# Patient Record
Sex: FEMALE | Race: WHITE | NOT HISPANIC OR LATINO | Employment: UNEMPLOYED | URBAN - METROPOLITAN AREA
[De-identification: names, ages, dates, MRNs, and addresses within clinical notes are randomized per-mention and may not be internally consistent; named-entity substitution may affect disease eponyms.]

---

## 2009-03-17 LAB — EXTERNAL HIV SCREEN: NORMAL

## 2017-01-02 ENCOUNTER — HOSPITAL ENCOUNTER (OUTPATIENT)
Dept: RADIOLOGY | Facility: MEDICAL CENTER | Age: 42
Discharge: HOME | End: 2017-01-02
Payer: COMMERCIAL

## 2017-01-02 ENCOUNTER — ALLSCRIPTS OFFICE VISIT (OUTPATIENT)
Dept: OTHER | Facility: OTHER | Age: 42
End: 2017-01-02

## 2017-01-02 ENCOUNTER — TRANSCRIBE ORDERS (OUTPATIENT)
Dept: ADMINISTRATIVE | Facility: HOSPITAL | Age: 42
End: 2017-01-02
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
## 2017-01-02 DIAGNOSIS — S59.901A INJURY OF RIGHT ELBOW: ICD-10-CM

## 2017-01-02 PROCEDURE — 73080 X-RAY EXAM OF ELBOW: CPT

## 2017-01-03 ENCOUNTER — GENERIC CONVERSION - ENCOUNTER (OUTPATIENT)
Dept: OTHER | Facility: OTHER | Age: 42
End: 2017-01-03

## 2017-01-06 ENCOUNTER — GENERIC CONVERSION - ENCOUNTER (OUTPATIENT)
Dept: OTHER | Facility: OTHER | Age: 42
End: 2017-01-06

## 2017-05-01 DIAGNOSIS — D68.9 COAGULATION DEFECT (HCC): ICD-10-CM

## 2017-05-01 DIAGNOSIS — E78.5 HYPERLIPIDEMIA: ICD-10-CM

## 2017-05-01 DIAGNOSIS — R73.03 PREDIABETES: ICD-10-CM

## 2017-05-01 DIAGNOSIS — E03.9 HYPOTHYROIDISM: ICD-10-CM

## 2017-06-23 ENCOUNTER — ALLSCRIPTS OFFICE VISIT (OUTPATIENT)
Dept: OTHER | Facility: OTHER | Age: 42
End: 2017-06-23

## 2017-06-30 ENCOUNTER — ALLSCRIPTS OFFICE VISIT (OUTPATIENT)
Dept: OTHER | Facility: OTHER | Age: 42
End: 2017-06-30

## 2017-08-31 ENCOUNTER — ALLSCRIPTS OFFICE VISIT (OUTPATIENT)
Dept: OTHER | Facility: OTHER | Age: 42
End: 2017-08-31

## 2017-09-01 ENCOUNTER — GENERIC CONVERSION - ENCOUNTER (OUTPATIENT)
Dept: OTHER | Facility: OTHER | Age: 42
End: 2017-09-01

## 2017-11-02 ENCOUNTER — GENERIC CONVERSION - ENCOUNTER (OUTPATIENT)
Dept: OTHER | Facility: OTHER | Age: 42
End: 2017-11-02

## 2018-01-12 NOTE — RESULT NOTES
Verified Results  * XR ELBOW 3+ VIEW RIGHT 02Jan2017 05:24PM Ami Gunn Order Number: [id number]     Test Name Result Flag Reference   XR ELBOW 3+ VW RIGHT (Report)     RIGHT ELBOW     INDICATION: Posterior right elbow pain and swelling  COMPARISON: None     VIEWS: 4; 4 images     FINDINGS:     There is no acute fracture or dislocation  There is no joint effusion  No degenerative changes  No lytic or blastic lesions are seen  Soft tissues are unremarkable  IMPRESSION:     No acute osseous abnormality         Workstation performed: [id number]     Signed by:   Beverley Chamorro MD   1/3/17

## 2018-01-13 ENCOUNTER — GENERIC CONVERSION - ENCOUNTER (OUTPATIENT)
Dept: OTHER | Facility: OTHER | Age: 43
End: 2018-01-13

## 2018-01-13 VITALS
RESPIRATION RATE: 16 BRPM | TEMPERATURE: 98.1 F | SYSTOLIC BLOOD PRESSURE: 140 MMHG | OXYGEN SATURATION: 99 % | HEART RATE: 87 BPM | DIASTOLIC BLOOD PRESSURE: 90 MMHG

## 2018-01-13 VITALS
TEMPERATURE: 98.5 F | OXYGEN SATURATION: 98 % | DIASTOLIC BLOOD PRESSURE: 90 MMHG | SYSTOLIC BLOOD PRESSURE: 140 MMHG | HEART RATE: 86 BPM

## 2018-01-14 VITALS
DIASTOLIC BLOOD PRESSURE: 84 MMHG | HEART RATE: 93 BPM | HEIGHT: 67 IN | TEMPERATURE: 99 F | SYSTOLIC BLOOD PRESSURE: 134 MMHG | RESPIRATION RATE: 16 BRPM | OXYGEN SATURATION: 99 %

## 2018-01-15 NOTE — PSYCH
History of Present Illness  Psychotherapy Provided  Luke: Individual Psychotherapy 50 minutes provided today  Goals addressed in session:   Met with pt individually for the initial session  Pt states that she has been in many high stress situations and that she has been able to manage her anxiety but that over the past month it has been uncontrollable  Pt shared some of the current significant stressors that has been experiencing and we discussed the "lack of control" she has over them  Discussed that due to the pt's history of very stressful situations she is more prone to thinking the worse case scenarios  Allowed pt to vent about the stressors and validated her frustrations  Pt will follow up in a few weeks to continue to talk through the stressors and develop some coping skills  HPI - Psych: Pt has been prescribed hydroxyzine and Xanax as needed, as well as Sertraline  Pt has not started the Sertraline due to the warnings labels and wanting to talk to the doctor further  Pt has 4 children under the age of 15 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  One of pt's major stressors is related to her oldest son being bullied at school and having some significant unexplained health issues  Pt in the past worked as a  in [place]  Pt was calm and cooperative throughout the session  Pt's mood and affect appeared to be within normal limits  Pt denies SI   Note   Note:   Encouraged pt to try to continue to find the control in situations no matter how small it may seem and to take some time when she can to herself  Pt will follow up in a few weeks to continues to evaluate her anxiety and develop coping skills  Assessment    1   Anxiety (300 00) (F41 9)    Signatures   Electronically signed by : Carrol Morin LCSW; Jun 30 2017 10:54AM EST                       (Author)

## 2018-01-24 ENCOUNTER — TELEPHONE (OUTPATIENT)
Dept: FAMILY MEDICINE CLINIC | Facility: CLINIC | Age: 43
End: 2018-01-24

## 2018-01-24 VITALS
DIASTOLIC BLOOD PRESSURE: 78 MMHG | SYSTOLIC BLOOD PRESSURE: 118 MMHG | HEART RATE: 85 BPM | RESPIRATION RATE: 17 BRPM | HEIGHT: 67 IN | TEMPERATURE: 98.5 F | OXYGEN SATURATION: 85 %

## 2018-01-24 DIAGNOSIS — E03.9 HYPOTHYROIDISM, UNSPECIFIED TYPE: ICD-10-CM

## 2018-01-24 DIAGNOSIS — K13.79 MASS OF ORAL CAVITY: Primary | ICD-10-CM

## 2018-01-24 RX ORDER — LEVOTHYROXINE SODIUM 0.07 MG/1
75 TABLET ORAL DAILY
Qty: 30 TABLET | Refills: 0 | Status: SHIPPED | OUTPATIENT
Start: 2018-01-24 | End: 2018-02-10 | Stop reason: SDUPTHER

## 2018-01-24 RX ORDER — OXYCODONE AND ACETAMINOPHEN 7.5; 325 MG/1; MG/1
1 TABLET ORAL EVERY 12 HOURS
COMMUNITY
Start: 2015-12-01 | End: 2018-01-24 | Stop reason: SDUPTHER

## 2018-01-24 RX ORDER — OXYCODONE AND ACETAMINOPHEN 7.5; 325 MG/1; MG/1
1 TABLET ORAL EVERY 12 HOURS
Qty: 30 TABLET | Refills: 0 | Status: SHIPPED | OUTPATIENT
Start: 2018-01-24 | End: 2018-11-09 | Stop reason: SDUPTHER

## 2018-01-24 RX ORDER — LEVOTHYROXINE SODIUM 0.07 MG/1
1 TABLET ORAL DAILY
COMMUNITY
Start: 2017-08-11 | End: 2018-01-24 | Stop reason: SDUPTHER

## 2018-01-25 DIAGNOSIS — F41.9 ANXIETY: Primary | ICD-10-CM

## 2018-01-26 ENCOUNTER — TELEPHONE (OUTPATIENT)
Dept: FAMILY MEDICINE CLINIC | Facility: CLINIC | Age: 43
End: 2018-01-26

## 2018-01-26 RX ORDER — EZETIMIBE 10 MG/1
1 TABLET ORAL DAILY
COMMUNITY
Start: 2017-08-31 | End: 2018-03-30 | Stop reason: SDUPTHER

## 2018-01-26 RX ORDER — ALPRAZOLAM 0.5 MG/1
TABLET ORAL
COMMUNITY
Start: 2013-08-31 | End: 2018-02-07 | Stop reason: SDUPTHER

## 2018-01-26 RX ORDER — HYDROXYZINE PAMOATE 50 MG/1
CAPSULE ORAL
Qty: 90 CAPSULE | Refills: 1 | Status: SHIPPED | OUTPATIENT
Start: 2018-01-26 | End: 2018-01-29 | Stop reason: SDUPTHER

## 2018-01-26 RX ORDER — HYDROXYZINE PAMOATE 50 MG/1
CAPSULE ORAL
COMMUNITY
Start: 2017-06-23 | End: 2018-05-03 | Stop reason: SDUPTHER

## 2018-01-26 RX ORDER — HYDROCODONE BITARTRATE AND ACETAMINOPHEN 5; 325 MG/1; MG/1
1 TABLET ORAL EVERY 6 HOURS PRN
COMMUNITY
Start: 2018-01-13 | End: 2018-02-08 | Stop reason: SDUPTHER

## 2018-01-26 RX ORDER — FLUOXETINE 20 MG/1
TABLET, FILM COATED ORAL
Refills: 0 | COMMUNITY
Start: 2017-10-25 | End: 2018-04-25 | Stop reason: SDUPTHER

## 2018-01-26 NOTE — TELEPHONE ENCOUNTER
Pt called she picked up the rx Oxycodone 7 5-325MG  She has  a questions about the rx and wants to make sure this is the correct medication  Pt would like a return phone call   Pt's number is [phone]

## 2018-01-27 NOTE — TELEPHONE ENCOUNTER
Received inbox message regarding patient's prescription for hydrocodone  She is questioning if this is the correct prescription  I tried to call her back  No answer and voicemail box was full  I do not know that I can't answer the question in any case  I did not write the original prescription and appears to be a refill of the medication she was given several weeks ago so I do think it is probably correct

## 2018-01-29 DIAGNOSIS — F41.9 ANXIETY: ICD-10-CM

## 2018-01-29 RX ORDER — HYDROXYZINE PAMOATE 50 MG/1
CAPSULE ORAL
Qty: 90 CAPSULE | Refills: 1 | Status: SHIPPED | OUTPATIENT
Start: 2018-01-29 | End: 2018-03-09 | Stop reason: SDUPTHER

## 2018-02-07 DIAGNOSIS — F41.1 GENERALIZED ANXIETY DISORDER: Primary | ICD-10-CM

## 2018-02-07 NOTE — TELEPHONE ENCOUNTER
Patient called requesting rx for Endoscopy  Patient states she will drop off the pathology report for us to review

## 2018-02-08 DIAGNOSIS — G89.29 OTHER CHRONIC PAIN: Primary | ICD-10-CM

## 2018-02-08 DIAGNOSIS — E03.9 HYPOTHYROIDISM, UNSPECIFIED TYPE: ICD-10-CM

## 2018-02-08 DIAGNOSIS — F41.1 GENERALIZED ANXIETY DISORDER: ICD-10-CM

## 2018-02-08 PROBLEM — R73.03 PREDIABETES: Status: ACTIVE | Noted: 2017-02-17

## 2018-02-08 PROBLEM — E78.5 DYSLIPIDEMIA: Status: ACTIVE | Noted: 2017-02-17

## 2018-02-08 RX ORDER — ALPRAZOLAM 0.5 MG/1
0.5 TABLET ORAL 2 TIMES DAILY PRN
Qty: 30 TABLET | Refills: 1 | OUTPATIENT
Start: 2018-02-08 | End: 2018-02-08 | Stop reason: SDUPTHER

## 2018-02-08 RX ORDER — PREDNISONE 10 MG/1
TABLET ORAL
COMMUNITY
Start: 2018-01-13 | End: 2018-03-09 | Stop reason: SDUPTHER

## 2018-02-08 RX ORDER — ALPRAZOLAM 0.5 MG/1
0.5 TABLET ORAL 2 TIMES DAILY PRN
Qty: 180 TABLET | Refills: 1 | Status: SHIPPED | OUTPATIENT
Start: 2018-02-08 | End: 2018-07-31 | Stop reason: SDUPTHER

## 2018-02-08 RX ORDER — CLINDAMYCIN HYDROCHLORIDE 300 MG/1
CAPSULE ORAL
COMMUNITY
Start: 2018-01-08 | End: 2018-03-09 | Stop reason: SDUPTHER

## 2018-02-08 RX ORDER — CLARITHROMYCIN 500 MG/1
TABLET, COATED ORAL
COMMUNITY
Start: 2018-01-11 | End: 2018-03-09 | Stop reason: SDUPTHER

## 2018-02-08 RX ORDER — HYDROCODONE BITARTRATE AND ACETAMINOPHEN 5; 325 MG/1; MG/1
1 TABLET ORAL EVERY 6 HOURS PRN
Qty: 60 TABLET | Refills: 0 | Status: SHIPPED | OUTPATIENT
Start: 2018-02-08 | End: 2018-03-09 | Stop reason: SDUPTHER

## 2018-02-08 RX ORDER — ALPRAZOLAM 0.5 MG/1
0.5 TABLET ORAL 2 TIMES DAILY PRN
Qty: 30 TABLET | Refills: 0 | Status: CANCELLED | OUTPATIENT
Start: 2018-02-08

## 2018-02-08 NOTE — TELEPHONE ENCOUNTER
Pt called into the office stating that she needs her Alprazolam filled  [facility]  Pt stated to me that she should be getting 180 tabs and not 30 tabs  She is out of her med  Please call pt [facility]  Thank you!

## 2018-02-09 DIAGNOSIS — R22.1 SENSATION OF LUMP IN THROAT: Primary | ICD-10-CM

## 2018-02-09 DIAGNOSIS — R13.10 DYSPHAGIA, UNSPECIFIED TYPE: ICD-10-CM

## 2018-02-10 RX ORDER — LEVOTHYROXINE SODIUM 0.07 MG/1
75 TABLET ORAL DAILY
Qty: 90 TABLET | Refills: 1 | Status: SHIPPED | OUTPATIENT
Start: 2018-02-10 | End: 2018-07-18 | Stop reason: SDUPTHER

## 2018-03-09 ENCOUNTER — OFFICE VISIT (OUTPATIENT)
Dept: FAMILY MEDICINE CLINIC | Facility: CLINIC | Age: 43
End: 2018-03-09
Payer: COMMERCIAL

## 2018-03-09 VITALS
RESPIRATION RATE: 20 BRPM | SYSTOLIC BLOOD PRESSURE: 120 MMHG | DIASTOLIC BLOOD PRESSURE: 70 MMHG | TEMPERATURE: 97.3 F | OXYGEN SATURATION: 98 % | HEIGHT: 67 IN | HEART RATE: 80 BPM

## 2018-03-09 DIAGNOSIS — G50.1 ATYPICAL FACIAL PAIN: Primary | ICD-10-CM

## 2018-03-09 DIAGNOSIS — D10.30: ICD-10-CM

## 2018-03-09 PROCEDURE — 99214 OFFICE O/P EST MOD 30 MIN: CPT | Performed by: FAMILY MEDICINE

## 2018-03-09 RX ORDER — GABAPENTIN 300 MG/1
300 CAPSULE ORAL
Qty: 30 CAPSULE | Refills: 1 | Status: SHIPPED | OUTPATIENT
Start: 2018-03-09 | End: 2018-05-02 | Stop reason: SDUPTHER

## 2018-03-09 RX ORDER — FLUTICASONE PROPIONATE 50 MCG
SPRAY, SUSPENSION (ML) NASAL
COMMUNITY
Start: 2018-02-26

## 2018-03-09 NOTE — PROGRESS NOTES
Assessment/Plan:   1  Atypical facial pain  Reviewed patient's symptoms with her  She has been having persistent discomfort  She states that she has not reviewed her biopsy results with her oral surgeon yet  At this time, request records for further evaluation  She has been in to see her otolaryngologist   She was advised to continue with her current treatment  She is continue with a mechanically soft diet  It is unclear if her symptoms are neuropathic  At this time, start treatment empirically with gabapentin 300 mg q h s     She was advised that persisting use of her oxycodone is not advisable  Hold off on continue with this treatment   - gabapentin (NEURONTIN) 300 mg capsule; Take 1 capsule (300 mg total) by mouth daily at bedtime  Dispense: 30 capsule; Refill: 1    2  Fibroma of oral cavity  Continue with regular follow-up with her oral surgeon   - gabapentin (NEURONTIN) 300 mg capsule; Take 1 capsule (300 mg total) by mouth daily at bedtime  Dispense: 30 capsule; Refill: 1     Diagnoses and all orders for this visit:    Atypical facial pain  -     gabapentin (NEURONTIN) 300 mg capsule; Take 1 capsule (300 mg total) by mouth daily at bedtime    Fibroma of oral cavity  -     gabapentin (NEURONTIN) 300 mg capsule; Take 1 capsule (300 mg total) by mouth daily at bedtime    Other orders  -     fluticasone (FLONASE) 50 mcg/act nasal spray;           Subjective:  Chief Complaint   Patient presents with    Follow-up     discuss biopsy results        Patient ID: Aarti Capps is a 36 y o  female  Patient is a 79-year-old female presents today for follow-up from her recent oral surgery  She states that she is here to review her biopsy results  She has called her oral surgeon's office multiple times to review this testing however she never received any call back  She brought in these results for review  She states that she was found to have a slow growing benign fibroma    She has been healing postoperatively  She unfortunately has been developing pain over the anterior portion of her face that radiates into her right ear  She has been having associated dizziness  She has been in to see her otolaryngologist   At this time, she has not been taking anything other than ibuprofen for symptom relief  Review of Systems   Constitutional: Negative for activity change, chills, fatigue and fever  HENT: Positive for facial swelling, sinus pain and sinus pressure  Negative for congestion, ear pain and sore throat  Eyes: Negative for redness, itching and visual disturbance  Respiratory: Negative for cough and shortness of breath  Cardiovascular: Negative for chest pain and palpitations  Gastrointestinal: Negative for abdominal pain, diarrhea and nausea  Endocrine: Negative for cold intolerance and heat intolerance  Genitourinary: Negative for dysuria, flank pain and frequency  Musculoskeletal: Negative for arthralgias, back pain, gait problem and myalgias  Skin: Negative for color change  Allergic/Immunologic: Negative for environmental allergies  Neurological: Negative for dizziness, numbness and headaches  Psychiatric/Behavioral: Negative for behavioral problems and sleep disturbance  The following portions of the patient's history were reviewed and updated as appropriate : past family history, past medical history, past social history and past surgical history  Objective:  Vitals:    03/09/18 0811   BP: 120/70   BP Location: Left arm   Patient Position: Sitting   Cuff Size: Large   Pulse: 80   Resp: 20   Temp: (!) 97 3 °F (36 3 °C)   TempSrc: Tympanic   SpO2: 98%   Height: 5' 6 53" (1 69 m)        Physical Exam   Constitutional: She appears well-developed and well-nourished  HENT:   Head: Normocephalic and atraumatic  Neck: Neck supple  Pulmonary/Chest: Effort normal and breath sounds normal    Psychiatric: She has a normal mood and affect   Her behavior is normal  Judgment and thought content normal

## 2018-03-30 DIAGNOSIS — E78.5 DYSLIPIDEMIA: Primary | ICD-10-CM

## 2018-03-30 RX ORDER — EZETIMIBE 10 MG/1
10 TABLET ORAL DAILY
Qty: 90 TABLET | Refills: 0 | Status: SHIPPED | OUTPATIENT
Start: 2018-03-30 | End: 2018-07-18 | Stop reason: SDUPTHER

## 2018-03-30 NOTE — TELEPHONE ENCOUNTER
Pt called and is still requesting her medication for Ezetimibe 90 day supply she left this on the RX line and [facility]  She needs 90 day supply or her insurance will not cover it

## 2018-04-03 ENCOUNTER — TELEPHONE (OUTPATIENT)
Dept: FAMILY MEDICINE CLINIC | Facility: CLINIC | Age: 43
End: 2018-04-03

## 2018-04-03 NOTE — TELEPHONE ENCOUNTER
----- Message from Jose Kathleen DO sent at 4/2/2018  9:28 AM EDT -----  Please  NAHUN hunter schedule her for Bw  Thank you  ----- Message -----  From: Kade Collado  Sent: 3/31/2018  11:51 AM  To: Jose Kathleen DO    Patient had a Lipid Panel done 01/08/18 at [facility]  Would you like her to have another test done now?      ----- Message -----  From: Jose Kathleen DO  Sent: 3/30/2018  12:30 AM  To: [address]    Please confirm when patient had her last blood work for her cholesterol  Thank you  ----- Message -----  From: Wojciech Chung MA  Sent: 3/29/2018   8:33 AM  To: Dino Ken DO    Pt stated she was told by Dr Tyson Oakley to get blood work done in 3 months when I read the message from Gilberto Lockett stated 6 month she confused  Please advised?   ----- Message -----  From: Bro Bernabe DO  Sent: 3/28/2018  10:53 AM  To: [address]    Please let patient know that I reviewed her recent blood work  It was did dated January 8th  On that blood work her blood sugar was very slightly elevated at 1 006  Normal is less than 100  The hemoglobin A1c was 6 3 which indicates just pre diabetes or borderline diabetes  This mostly requires stricter diet and regular exercise to control it  Cholesterol was elevated at 206 but this is actually an improvement from last year when it was 226  Thyroid test shows a TSH which is slightly above normal but this is also improved from what it was over the last 2 years  CBC is normal and chemistry panel is normal   No changes in any medication necessary at this time  Would recommend recheck of labs in about 6 months

## 2018-04-06 NOTE — TELEPHONE ENCOUNTER
SPOKE TO PT AND SHE STATED THAT SHE HAD AN APPT WITH YOU ABOUT 1 MONTH AGO AND TOLD HER TO GET BW DONE IN 3 MONTHS, BUT THE PT SPOKE TO DR Brian Duncan LAST WEEK AND TOLD HER TO GET HER BW DONE IN 6 MONTHS   PLEASE CLARIFY WHAT MONTH SHE NEEDS TO GET THIS BW DONE AND NOTIFY PT

## 2018-04-25 DIAGNOSIS — F41.9 ANXIETY: Primary | ICD-10-CM

## 2018-04-25 RX ORDER — FLUOXETINE 20 MG/1
TABLET, FILM COATED ORAL
Qty: 90 TABLET | Refills: 0 | Status: SHIPPED | OUTPATIENT
Start: 2018-04-25 | End: 2018-07-31 | Stop reason: SDUPTHER

## 2018-05-02 DIAGNOSIS — G50.1 ATYPICAL FACIAL PAIN: ICD-10-CM

## 2018-05-02 RX ORDER — GABAPENTIN 300 MG/1
300 CAPSULE ORAL
Qty: 30 CAPSULE | Refills: 1 | Status: SHIPPED | OUTPATIENT
Start: 2018-05-02 | End: 2018-07-31 | Stop reason: SDUPTHER

## 2018-05-03 DIAGNOSIS — F41.9 ANXIETY: Primary | ICD-10-CM

## 2018-05-03 RX ORDER — HYDROXYZINE PAMOATE 50 MG/1
CAPSULE ORAL
Qty: 90 CAPSULE | Refills: 1 | Status: SHIPPED | OUTPATIENT
Start: 2018-05-03 | End: 2018-11-02 | Stop reason: SDUPTHER

## 2018-05-15 ENCOUNTER — TELEPHONE (OUTPATIENT)
Dept: FAMILY MEDICINE CLINIC | Facility: CLINIC | Age: 43
End: 2018-05-15

## 2018-05-15 NOTE — TELEPHONE ENCOUNTER
Pt called stated she on levothyroxine and its been a week and she still has palpitations please advised pt unsure if she should come in for an appt, she is currently not taking medication  Pt also stated she wants an order to get Thyroid check for tomorrow

## 2018-05-15 NOTE — TELEPHONE ENCOUNTER
Check thyroid function testing tomorrow  If she discontinued her medications and is still having palpitations, it is unlikely secondary to her thyroid treatment  She would benefit from having an evaluation for this problem  Please schedule her immediately    Thank you

## 2018-05-16 ENCOUNTER — TELEPHONE (OUTPATIENT)
Dept: FAMILY MEDICINE CLINIC | Facility: CLINIC | Age: 43
End: 2018-05-16

## 2018-05-16 DIAGNOSIS — E03.9 HYPOTHYROIDISM, UNSPECIFIED TYPE: Primary | ICD-10-CM

## 2018-05-16 NOTE — TELEPHONE ENCOUNTER
The patient needs a script for blood work for her Thyroid level to be checked  She is requesting we send her script or she can pick it up at the [facility]  Patient did not state which [facility]  If there are any problems please call her [phone]

## 2018-05-16 NOTE — TELEPHONE ENCOUNTER
L/M/O/M detailed consent dorina  Asked Pt to call office back to schedule appointment IMMEDIATELY per   Dr Nguyễn Pena

## 2018-05-16 NOTE — TELEPHONE ENCOUNTER
Thyroid blood work ordered   Patient will either need to  Rx or we can fax to [facility],  But we need to know which [facility] she is going to

## 2018-05-17 NOTE — TELEPHONE ENCOUNTER
Pt called wants to go to [facility] in [place]  I faxed pt's thyroid script to the [facility] in [place] per pt's request  I did apologize to [facility] for the inconvenience that her blwk was not at the lab   Pt's Jayjay Damian was faxed to [phone] 5/17/18 at 10:58 am

## 2018-05-18 LAB
T4 FREE SERPL-MCNC: 0.75 NG/DL (ref 0.76–1.46)
TSH SERPL-ACNC: 12.4 M[IU]/L

## 2018-05-18 NOTE — TELEPHONE ENCOUNTER
I called and spoek to Esperanza Michaud she had her blwk done at [facility] in [place]  Pt is asking if we could please request a copy of her blood work  Which is in care everywhere her thyroid results are in  Pt stated her palpitations are lowering her resting heart rate has improved back to normal  Also  the palpations have gone down since being off of mediation  She [facility] she believes she got a bad batch of mediation  I informed pt that if her palpations return to please call us pt request we call her with her blwk results please

## 2018-05-22 ENCOUNTER — TELEPHONE (OUTPATIENT)
Dept: FAMILY MEDICINE CLINIC | Facility: CLINIC | Age: 43
End: 2018-05-22

## 2018-05-22 NOTE — TELEPHONE ENCOUNTER
Patient's thyroid results are already in her chart, not sure where Cristy looked  Would you please be able to review the results? They were sent to Dr Mahnaz Silva who placed the order

## 2018-05-22 NOTE — TELEPHONE ENCOUNTER
Pt called the office I gave her the results as you stated  She admitted she stopped her levothyroxine due to heart palpitations that were making her very uncomfortable  [facility] said sometimes the medication can be a stronger batch pending month to month  Pt has a refill at her pharmacy for the pure levothyroxine 75 mcg she wants to make sure that is correct? She was asking me questions that she was not sure of I informed her that I was reading per Dr Bishop  She wants to speak to someone who can explain more in detail  Patient was then transferred to speak with [facility]

## 2018-05-22 NOTE — TELEPHONE ENCOUNTER
PT HAD HER BW DRAWN AT [facility] LAST WEEK AND I DON'T SEE ANY RESULTS  CAN WE PLEASE GET THE RESULTS FOR THE PT AND CALL HER WITH THEM @ [phone]   PT IS ALSO REQUESTING A REFILL ON HER TIROSINT

## 2018-05-22 NOTE — TELEPHONE ENCOUNTER
Please call patient, please advise her that her thyroid stimulating hormone has increased in her thyroid hormone level has decreased  This puts her in a hypothyroid state  It would be best for her to strep restart her thyroid supplementation

## 2018-05-23 NOTE — TELEPHONE ENCOUNTER
Dr Jean-Claude Trivedi I called pt yesterday after our conversation  Pt was informed that it would be in her best interest to see an Endocrinologist  Pt stated that she was treated by a [address]  in the past but might want to see a [facility] Endo this time  Pt stated she would call me back with the providers information so referral can be placed

## 2018-05-23 NOTE — TELEPHONE ENCOUNTER
Spoke with pt and she stated that after her last refill in May she started experiencing heart palpitations after taking Levothyroxine  Pt wanted to confirm if she should indeed restart taking medication after having Sx's  Pt also was questioning why her T3  blood work wasn't drawn, Pt was informed that the lab never josé antonio the test  I called [facility] yesterday and requested test to be added to remainder of blood  I spoke with Dr Gerhardt Guest and he recommended pt to be seen by Endocrinology due to her Sx's  Pt will not start medication until she is seen by Endo  Pt was called and informed she stated that in the past she had seen a Isabela Turcios but might want to see a [facility] provider

## 2018-06-04 ENCOUNTER — TELEPHONE (OUTPATIENT)
Dept: FAMILY MEDICINE CLINIC | Facility: CLINIC | Age: 43
End: 2018-06-04

## 2018-06-04 NOTE — TELEPHONE ENCOUNTER
[facility] would like a blood work script sent to them for a T3, Free test   If they do not get a script then the patient will be charged for the blood work  Please fax the [facility] a copy of the script  [phone]  There was a discrepancy because they said they made a phone call and spoke with [facility] for the T3, Free, but I only saw one for a T4, free

## 2018-06-13 DIAGNOSIS — Z13.0 SCREENING FOR IRON DEFICIENCY ANEMIA: ICD-10-CM

## 2018-06-13 DIAGNOSIS — E03.9 HYPOTHYROIDISM, UNSPECIFIED TYPE: ICD-10-CM

## 2018-06-13 DIAGNOSIS — E78.5 DYSLIPIDEMIA: Primary | ICD-10-CM

## 2018-06-13 DIAGNOSIS — R73.03 PREDIABETES: ICD-10-CM

## 2018-07-06 ENCOUNTER — TELEPHONE (OUTPATIENT)
Dept: FAMILY MEDICINE CLINIC | Facility: CLINIC | Age: 43
End: 2018-07-06

## 2018-07-06 NOTE — TELEPHONE ENCOUNTER
Pt called inquiring about glucose as well other concerns  [facility] came [facility] at [facility]  Pt also wanted your advise about going on Metformin or getting a moniter  Please advise

## 2018-07-10 NOTE — TELEPHONE ENCOUNTER
Please call patient, a random blood sugar of 105 is not problematic  At her next checkup, we can do extensive testing for her    Thank you

## 2018-07-17 NOTE — TELEPHONE ENCOUNTER
Pt was notified of this information however she states that she had bw done in January and still has not received  The pt has the results but we don't and she is stating her A1C is high  That was done at [facility] in [place]  The patient wants us to gets all bw results from [facility] since January, have any doctor review them and call her  [phone]

## 2018-07-17 NOTE — TELEPHONE ENCOUNTER
Labs were obtained from [facility] and given to Cherylene Basket as she just spoke to the pt and is in the process of getting the pt a glucose machine

## 2018-07-18 ENCOUNTER — TELEPHONE (OUTPATIENT)
Dept: FAMILY MEDICINE CLINIC | Facility: CLINIC | Age: 43
End: 2018-07-18

## 2018-07-18 DIAGNOSIS — E03.9 HYPOTHYROIDISM, UNSPECIFIED TYPE: ICD-10-CM

## 2018-07-18 DIAGNOSIS — E78.5 DYSLIPIDEMIA: ICD-10-CM

## 2018-07-18 DIAGNOSIS — E78.2 MIXED HYPERLIPIDEMIA: Primary | ICD-10-CM

## 2018-07-18 RX ORDER — EZETIMIBE 10 MG/1
10 TABLET ORAL DAILY
Qty: 90 TABLET | Refills: 1 | Status: SHIPPED | OUTPATIENT
Start: 2018-07-18 | End: 2018-12-19 | Stop reason: SDUPTHER

## 2018-07-18 RX ORDER — FENOFIBRATE 160 MG/1
160 TABLET ORAL DAILY
Qty: 90 TABLET | Refills: 1 | Status: SHIPPED | OUTPATIENT
Start: 2018-07-18 | End: 2019-03-25

## 2018-07-18 RX ORDER — LEVOTHYROXINE SODIUM 0.1 MG/1
100 TABLET ORAL DAILY
Qty: 90 TABLET | Refills: 1 | Status: SHIPPED | OUTPATIENT
Start: 2018-07-18 | End: 2018-07-30 | Stop reason: SDUPTHER

## 2018-07-18 NOTE — TELEPHONE ENCOUNTER
Patient is asking for the results of her FBW  They are under Care Everywhere from \[facility]\""

## 2018-07-18 NOTE — TELEPHONE ENCOUNTER
Spoke to pt reviewing her BW results in care everywhere  Started her on fenofibrate 160mg for elevated triglycerides and issues with prediabetes  Continue zetia  TSH was elevated > 7, increased her levothyroxine from 75mcg to 100mcg  All meds sent in  I explained to pt the importance of being evaluated in the office regularly and scheduling a fu appt in office to review these numbers and discuss concerns as pt also state [facility] and also couldn't get appt with endo (referred by Dr Cedric Cason) until nov  For palpitations  I discussed with pt importance of regular f/u's for these reasons and determine if pt needs evaluation or changes to treatments  Pt understands and will be scheduling f/u with dr Carolina Myers within next month  I spent about 20min with pt on phone

## 2018-07-30 ENCOUNTER — TELEPHONE (OUTPATIENT)
Dept: FAMILY MEDICINE CLINIC | Facility: CLINIC | Age: 43
End: 2018-07-30

## 2018-07-30 ENCOUNTER — OFFICE VISIT (OUTPATIENT)
Dept: FAMILY MEDICINE CLINIC | Facility: CLINIC | Age: 43
End: 2018-07-30
Payer: COMMERCIAL

## 2018-07-30 VITALS
SYSTOLIC BLOOD PRESSURE: 120 MMHG | RESPIRATION RATE: 16 BRPM | HEART RATE: 78 BPM | OXYGEN SATURATION: 98 % | TEMPERATURE: 95.6 F | DIASTOLIC BLOOD PRESSURE: 80 MMHG

## 2018-07-30 DIAGNOSIS — G50.1 ATYPICAL FACIAL PAIN: ICD-10-CM

## 2018-07-30 DIAGNOSIS — F41.1 GENERALIZED ANXIETY DISORDER: ICD-10-CM

## 2018-07-30 DIAGNOSIS — E78.5 DYSLIPIDEMIA: Primary | ICD-10-CM

## 2018-07-30 DIAGNOSIS — F41.9 ANXIETY: ICD-10-CM

## 2018-07-30 DIAGNOSIS — E78.2 MIXED HYPERLIPIDEMIA: Primary | ICD-10-CM

## 2018-07-30 DIAGNOSIS — E03.9 HYPOTHYROIDISM, UNSPECIFIED TYPE: ICD-10-CM

## 2018-07-30 DIAGNOSIS — R73.03 PREDIABETES: ICD-10-CM

## 2018-07-30 DIAGNOSIS — R00.2 PALPITATIONS: ICD-10-CM

## 2018-07-30 DIAGNOSIS — C49.9: ICD-10-CM

## 2018-07-30 PROCEDURE — 99214 OFFICE O/P EST MOD 30 MIN: CPT | Performed by: FAMILY MEDICINE

## 2018-07-30 RX ORDER — LEVOTHYROXINE SODIUM 88 UG/1
88 TABLET ORAL DAILY
Qty: 90 TABLET | Refills: 1 | Status: SHIPPED | OUTPATIENT
Start: 2018-07-30 | End: 2018-07-31 | Stop reason: SDUPTHER

## 2018-07-30 NOTE — TELEPHONE ENCOUNTER
Patient forgot to get refills on her RX today at her visit:    Omega 3 acid Ethyl Esters 1 gm oral capsule - 360 capsules, 3 refills- take 2 capsules twice a day- [facility]     Swntlz86 mg tablet- 90 tablets no refills take 1/2 tablet by mouth daily- [facility]    Alprazolam  5 mg tablet- 180 tablets- 1 refill - take 1 tablet by mouth 2 times a day as needed for anxiety [facility]    Gabapentin 300 mg capsule - 30 capsules- 1 refill- take 1 capsule by mouth daily at bedtime- [facility]

## 2018-07-31 DIAGNOSIS — E03.9 HYPOTHYROIDISM, UNSPECIFIED TYPE: ICD-10-CM

## 2018-07-31 RX ORDER — OMEGA-3-ACID ETHYL ESTERS 1 G/1
2 CAPSULE, LIQUID FILLED ORAL 2 TIMES DAILY
Qty: 360 CAPSULE | Refills: 1 | Status: SHIPPED | OUTPATIENT
Start: 2018-07-31 | End: 2019-01-09 | Stop reason: SDUPTHER

## 2018-07-31 RX ORDER — LEVOTHYROXINE SODIUM 88 UG/1
88 TABLET ORAL DAILY
Qty: 90 TABLET | Refills: 0 | Status: SHIPPED | OUTPATIENT
Start: 2018-07-31 | End: 2018-12-19 | Stop reason: SDUPTHER

## 2018-07-31 RX ORDER — GABAPENTIN 300 MG/1
300 CAPSULE ORAL
Qty: 30 CAPSULE | Refills: 0 | Status: SHIPPED | OUTPATIENT
Start: 2018-07-31 | End: 2019-03-17

## 2018-07-31 RX ORDER — FLUOXETINE 20 MG/1
10 TABLET, FILM COATED ORAL DAILY
Qty: 90 TABLET | Refills: 0 | Status: SHIPPED | OUTPATIENT
Start: 2018-07-31 | End: 2019-03-17

## 2018-07-31 RX ORDER — ALPRAZOLAM 0.5 MG/1
0.5 TABLET ORAL 2 TIMES DAILY PRN
Qty: 180 TABLET | Refills: 1 | OUTPATIENT
Start: 2018-07-31 | End: 2019-01-29 | Stop reason: SDUPTHER

## 2018-07-31 NOTE — PROGRESS NOTES
Assessment/Plan:   1  Hypothyroidism, unspecified type  Reviewed patient's symptoms with her  At this time, her thyroid function testing did have a appear abnormal   She appeared to be in hypothyroid state  She does have an appointment with Endocrinology this fall  At this time, will increase her dose of levothyroxine to 88 mcg  Recheck thyroid function testing 2 months  - levothyroxine 88 mcg tablet; Take 1 tablet (88 mcg total) by mouth daily  Dispense: 90 tablet; Refill: 1  - TSH, 3rd generation with T4 reflex; Future    2  Dyslipidemia  Reviewed fasting lipid panel with patient as well  Her trigger this slip triglycerides appeared elevated  She does take her Zetia regularly  She has had adverse reactions with statins in the past   At this time, she was advised that control of her dyslipidemia may be difficult as her thyroid is currently not in a euthyroid state  Will correct hypothyroidism  At this time, continue with her Zetia  She may hold off on her fenofibrate at this time  She may likely need to be on this treatment  3  Prediabetes  Previous A1c was stable  She was in a prediabetic state  At this time, she was advised to continue with a strict diet and exercise plan  4  Palpitations  Unclear as to the exact etiology of patient's palpitations  At this time, will check 48 hour Holter monitor for to further evaluate the symptoms  If she has any worsening symptoms, she must go strictly to the ED    - Holter monitor - 48 hour; Future     Diagnoses and all orders for this visit:    Dyslipidemia    Hypothyroidism, unspecified type  -     levothyroxine 88 mcg tablet; Take 1 tablet (88 mcg total) by mouth daily  -     TSH, 3rd generation with T4 reflex; Future    Prediabetes    Palpitations  -     Holter monitor - 48 hour; Future          Subjective:    Chief Complaint   Patient presents with    Follow-up     discuss blood work        Patient ID: Charly Price is a 36 y o  female  Patient is a 21-year-old female presents today for follow-up on chronic conditions  She has hypothyroidism, dyslipidemia  She has been having persistent palpitations as well  She states that she has been taking her medications regularly and tolerating them very well  She does eat a healthy diet and exercises regularly  She would like to review her blood work much further  Review of Systems   Constitutional: Negative for activity change, chills, fatigue and fever  HENT: Negative for congestion, ear pain, sinus pressure and sore throat  Eyes: Negative for redness, itching and visual disturbance  Respiratory: Negative for cough and shortness of breath  Cardiovascular: Positive for palpitations  Negative for chest pain  Gastrointestinal: Negative for abdominal pain, diarrhea and nausea  Endocrine: Negative for cold intolerance and heat intolerance  Genitourinary: Negative for dysuria, flank pain and frequency  Musculoskeletal: Negative for arthralgias, back pain, gait problem and myalgias  Skin: Negative for color change  Allergic/Immunologic: Negative for environmental allergies  Neurological: Negative for dizziness, numbness and headaches  Psychiatric/Behavioral: Negative for behavioral problems and sleep disturbance  The following portions of the patient's history were reviewed and updated as appropriate : past family history, past medical history, past social history and past surgical history        Current Outpatient Prescriptions:     ALPRAZolam (XANAX) 0 5 mg tablet, Take 1 tablet (0 5 mg total) by mouth 2 (two) times a day as needed for anxiety, Disp: 180 tablet, Rfl: 1    aspirin 81 MG tablet, Take 1 tablet by mouth daily, Disp: , Rfl:     ezetimibe (ZETIA) 10 mg tablet, Take 1 tablet (10 mg total) by mouth daily for 90 days, Disp: 90 tablet, Rfl: 1    FLUoxetine (PROzac) 20 MG tablet, TAKE 1/2 A TABLET BY MOUTH DAILY, Disp: 90 tablet, Rfl: 0   gabapentin (NEURONTIN) 300 mg capsule, TAKE 1 CAPSULE (300 MG TOTAL) BY MOUTH DAILY AT BEDTIME, Disp: 30 capsule, Rfl: 1    hydrOXYzine pamoate (VISTARIL) 50 mg capsule, TAKE 1 CAPSULE 3 TIMES DAILY AS NEEDED FOR ANXIETY, Disp: 90 capsule, Rfl: 1    levothyroxine 88 mcg tablet, Take 1 tablet (88 mcg total) by mouth daily, Disp: 90 tablet, Rfl: 1    fenofibrate (TRIGLIDE) 160 MG tablet, Take 1 tablet (160 mg total) by mouth daily, Disp: 90 tablet, Rfl: 1    fluticasone (FLONASE) 50 mcg/act nasal spray, , Disp: , Rfl:     oxyCODONE-acetaminophen (PERCOCET) 7 5-325 MG per tablet, Take 1 tablet by mouth every 12 (twelve) hours Earliest Fill Date: 1/24/18 Max Daily Amount: 2 tablets, Disp: 30 tablet, Rfl: 0    Objective:    Vitals:    07/30/18 1016   BP: 120/80   BP Location: Left arm   Patient Position: Sitting   Cuff Size: Standard   Pulse: 78   Resp: 16   Temp: (!) 95 6 °F (35 3 °C)   SpO2: 98%        Physical Exam   Constitutional: She is oriented to person, place, and time  She appears well-developed and well-nourished  HENT:   Head: Normocephalic and atraumatic  Nose: Nose normal    Mouth/Throat: No oropharyngeal exudate  Eyes: Pupils are equal, round, and reactive to light  Right eye exhibits no discharge  Left eye exhibits no discharge  Neck: Normal range of motion  Neck supple  No tracheal deviation present  Cardiovascular: Normal rate, regular rhythm and intact distal pulses  Exam reveals no gallop and no friction rub  No murmur heard  Pulses:       Dorsalis pedis pulses are 2+ on the right side, and 2+ on the left side  Posterior tibial pulses are 2+ on the right side, and 2+ on the left side  Pulmonary/Chest: Effort normal and breath sounds normal  No respiratory distress  She has no wheezes  She has no rales  Abdominal: Soft  Bowel sounds are normal  She exhibits no distension  There is no tenderness  There is no rebound and no guarding     Musculoskeletal: Normal range of motion  She exhibits no edema  Lymphadenopathy:        Head (right side): No submental and no submandibular adenopathy present  Head (left side): No submental and no submandibular adenopathy present  She has no cervical adenopathy  Right cervical: No superficial cervical, no deep cervical and no posterior cervical adenopathy present  Left cervical: No superficial cervical, no deep cervical and no posterior cervical adenopathy present  Neurological: She is alert and oriented to person, place, and time  No cranial nerve deficit or sensory deficit  Skin: Skin is warm, dry and intact  Psychiatric: Her speech is normal and behavior is normal  Judgment normal  Her mood appears not anxious  Cognition and memory are normal  She does not exhibit a depressed mood  Vitals reviewed

## 2018-08-07 ENCOUNTER — TELEPHONE (OUTPATIENT)
Dept: FAMILY MEDICINE CLINIC | Facility: CLINIC | Age: 43
End: 2018-08-07

## 2018-08-07 NOTE — TELEPHONE ENCOUNTER
BRANNON  Pt called inquiring if we sent her script for Xanax over to the pharmacy  She stated when she called on 7/31 to verify it was there and they told her they had to hold it until 8/5 to fill  Pt went back over yesterday to pick it up and they told her they lost the script so they can't fill it  Informed pt we did call in the rx on 7/31  Pt said she will call the pharmacy back and let them know, but if we get a call from the pharmacy today please authorize to fill  Pt stated she will be out of the medication today

## 2018-10-31 ENCOUNTER — TELEPHONE (OUTPATIENT)
Dept: ENDOCRINOLOGY | Facility: CLINIC | Age: 43
End: 2018-10-31

## 2018-10-31 NOTE — TELEPHONE ENCOUNTER
Called pt to see if she had any labs done recently, she stated she was getting ready to cancel her appt for 11/1  She will call back to reschedule

## 2018-11-02 DIAGNOSIS — F41.9 ANXIETY: ICD-10-CM

## 2018-11-02 RX ORDER — FLUOXETINE 20 MG/1
TABLET, FILM COATED ORAL
Qty: 90 TABLET | Refills: 0 | Status: SHIPPED | OUTPATIENT
Start: 2018-11-02 | End: 2019-03-17

## 2018-11-05 RX ORDER — HYDROXYZINE PAMOATE 50 MG/1
CAPSULE ORAL
Qty: 90 CAPSULE | Refills: 0 | Status: SHIPPED | OUTPATIENT
Start: 2018-11-05 | End: 2019-09-17

## 2018-11-09 ENCOUNTER — TELEPHONE (OUTPATIENT)
Dept: FAMILY MEDICINE CLINIC | Facility: CLINIC | Age: 43
End: 2018-11-09

## 2018-11-09 ENCOUNTER — OFFICE VISIT (OUTPATIENT)
Dept: FAMILY MEDICINE CLINIC | Facility: CLINIC | Age: 43
End: 2018-11-09
Payer: COMMERCIAL

## 2018-11-09 VITALS
RESPIRATION RATE: 16 BRPM | TEMPERATURE: 99.1 F | DIASTOLIC BLOOD PRESSURE: 80 MMHG | HEART RATE: 91 BPM | SYSTOLIC BLOOD PRESSURE: 120 MMHG | OXYGEN SATURATION: 97 %

## 2018-11-09 DIAGNOSIS — Z23 NEED FOR INFLUENZA VACCINATION: ICD-10-CM

## 2018-11-09 DIAGNOSIS — M25.571 CHRONIC PAIN OF RIGHT ANKLE: Primary | ICD-10-CM

## 2018-11-09 DIAGNOSIS — K13.79 MASS OF ORAL CAVITY: ICD-10-CM

## 2018-11-09 DIAGNOSIS — M25.571 CHRONIC PAIN OF RIGHT ANKLE: ICD-10-CM

## 2018-11-09 DIAGNOSIS — Z13.1 SCREENING FOR DIABETES MELLITUS: ICD-10-CM

## 2018-11-09 DIAGNOSIS — Z13.220 SCREENING FOR CHOLESTEROL LEVEL: ICD-10-CM

## 2018-11-09 DIAGNOSIS — G89.29 CHRONIC PAIN OF RIGHT ANKLE: ICD-10-CM

## 2018-11-09 DIAGNOSIS — G89.29 CHRONIC PAIN OF RIGHT ANKLE: Primary | ICD-10-CM

## 2018-11-09 DIAGNOSIS — Z13.29 SCREENING FOR THYROID DISORDER: ICD-10-CM

## 2018-11-09 PROCEDURE — 90471 IMMUNIZATION ADMIN: CPT | Performed by: FAMILY MEDICINE

## 2018-11-09 PROCEDURE — 99214 OFFICE O/P EST MOD 30 MIN: CPT | Performed by: FAMILY MEDICINE

## 2018-11-09 PROCEDURE — 90682 RIV4 VACC RECOMBINANT DNA IM: CPT | Performed by: FAMILY MEDICINE

## 2018-11-09 RX ORDER — MELOXICAM 15 MG/1
15 TABLET ORAL DAILY
Qty: 30 TABLET | Refills: 0 | Status: SHIPPED | OUTPATIENT
Start: 2018-11-09 | End: 2018-11-09 | Stop reason: SDUPTHER

## 2018-11-09 RX ORDER — OXYCODONE AND ACETAMINOPHEN 7.5; 325 MG/1; MG/1
1 TABLET ORAL EVERY 12 HOURS
Qty: 30 TABLET | Refills: 0 | Status: SHIPPED | OUTPATIENT
Start: 2018-11-09 | End: 2018-12-03 | Stop reason: SDUPTHER

## 2018-11-09 RX ORDER — MELOXICAM 15 MG/1
15 TABLET ORAL DAILY
Qty: 30 TABLET | Refills: 0 | Status: SHIPPED | OUTPATIENT
Start: 2018-11-09 | End: 2018-12-03 | Stop reason: SDUPTHER

## 2018-11-09 RX ORDER — OXYCODONE HYDROCHLORIDE AND ACETAMINOPHEN 5; 325 MG/1; MG/1
1 TABLET ORAL EVERY 8 HOURS PRN
Qty: 30 TABLET | Refills: 0 | Status: SHIPPED | OUTPATIENT
Start: 2018-11-09 | End: 2018-11-09

## 2018-11-09 NOTE — TELEPHONE ENCOUNTER
Pt called the office for the rx for her cam boot she is going to get fitted and should it be a low boot or high boot not specified on the script please address

## 2018-11-11 NOTE — TELEPHONE ENCOUNTER
Called pt to let her know per Dr Kosta Marin it should be a low boot  Pt's mailbox is full and message could not be left

## 2018-11-12 NOTE — TELEPHONE ENCOUNTER
Spoke with Pt, returning missed call from yesterday  Advised pt per Dr Salbador Simmons she should get the low boot  Pt said thank you, she is actually going over today to get fitted

## 2018-11-12 NOTE — PROGRESS NOTES
Assessment/Plan:   1  Chronic pain of right ankle  Reviewed patient's symptoms today  At this time, it appears that has been having moderate flare-ups of her ankle pain  At this time, it appears that she has strained her ankle will hold off on the checking x-ray imaging  She was advised the importance therapeutic exercises  Will refer patient to PT  She may use a Cam boot/ankle brace wall on vacation  Will refer patient to Orthopedics for further evaluation  If any symptoms should worsen, she was advised to follow up  She may continue with anti-inflammatories  She is given a prescription for Percocet only to be used for severe pain relief  - Ambulatory referral to Orthopedic Surgery; Future  - Cam Boot  - Ambulatory referral to Physical Therapy; Future  - Ankle Cude ankle/Ankle Brace    2  Need for influenza vaccination  - influenza vaccine, 2563-1789, quadrivalent, recombinant, PF, 0 5 mL, for patients 18-49 yr with comorbidities (FLUBLOK)    3  Screening for cholesterol level  - Lipid Panel with Direct LDL reflex; Future    4  Screening for diabetes mellitus  - Comprehensive metabolic panel; Future    5  Screening for thyroid disorder  - TSH, 3rd generation with Free T4 reflex; Future     Diagnoses and all orders for this visit:    Chronic pain of right ankle  -     Ambulatory referral to Orthopedic Surgery; Future  -     Cam Boot  -     Ambulatory referral to Physical Therapy; Future  -     Ankle Cude ankle/Ankle Brace  -     Discontinue: meloxicam (MOBIC) 15 mg tablet; Take 1 tablet (15 mg total) by mouth daily  -     Discontinue: oxyCODONE-acetaminophen (PERCOCET) 5-325 mg per tablet;  Take 1 tablet by mouth every 8 (eight) hours as needed for moderate pain Max Daily Amount: 3 tablets    Need for influenza vaccination  -     influenza vaccine, 1468-3232, quadrivalent, recombinant, PF, 0 5 mL, for patients 18-49 yr with comorbidities (FLUBLOK)    Screening for cholesterol level  -     Lipid Panel with Direct LDL reflex; Future    Screening for diabetes mellitus  -     Comprehensive metabolic panel; Future    Screening for thyroid disorder  -     TSH, 3rd generation with Free T4 reflex; Future          Subjective:    Chief Complaint   Patient presents with    Follow-up     twisted right ankle        Patient ID: Yonathan Brewer is a 39 y o  female  Patient is a 79-year-old female presents today with CC of right ankle pain  She states that she believes that she twisted her ankle while she was on a soccer feel  She has been having pain over the lateral aspect of her ankle  She has been having difficulty with walking  She states that she will be going on vacation and will be doing a significant mental walking  She denies any swelling or bruising in this area  She denies any other problems with this  Has not been taking anything currently for symptom relief  Review of Systems   Constitutional: Negative for activity change, chills, fatigue and fever  HENT: Negative for congestion, ear pain, sinus pressure and sore throat  Eyes: Negative for redness, itching and visual disturbance  Respiratory: Negative for cough and shortness of breath  Cardiovascular: Negative for chest pain and palpitations  Gastrointestinal: Negative for abdominal pain, diarrhea and nausea  Endocrine: Negative for cold intolerance and heat intolerance  Genitourinary: Negative for dysuria, flank pain and frequency  Musculoskeletal: Negative for arthralgias, back pain, gait problem and myalgias  Skin: Negative for color change  Allergic/Immunologic: Negative for environmental allergies  Neurological: Negative for dizziness, numbness and headaches  Psychiatric/Behavioral: Negative for behavioral problems and sleep disturbance           The following portions of the patient's history were reviewed and updated as appropriate : past family history, past medical history, past social history and past surgical history  Current Outpatient Prescriptions:     ALPRAZolam (XANAX) 0 5 mg tablet, Take 1 tablet (0 5 mg total) by mouth 2 (two) times a day as needed for anxiety, Disp: 180 tablet, Rfl: 1    aspirin 81 MG tablet, Take 1 tablet by mouth daily, Disp: , Rfl:     FLUoxetine (PROzac) 20 MG tablet, Take 0 5 tablets (10 mg total) by mouth daily, Disp: 90 tablet, Rfl: 0    hydrOXYzine pamoate (VISTARIL) 50 mg capsule, TAKE 1 CAPSULE 3 TIMES DAILY AS NEEDED FOR ANXIETY, Disp: 90 capsule, Rfl: 0    levothyroxine 88 mcg tablet, Take 1 tablet (88 mcg total) by mouth daily, Disp: 90 tablet, Rfl: 0    omega-3-acid ethyl esters (LOVAZA) 1 g capsule, Take 2 capsules (2 g total) by mouth 2 (two) times a day, Disp: 360 capsule, Rfl: 1    ezetimibe (ZETIA) 10 mg tablet, Take 1 tablet (10 mg total) by mouth daily for 90 days, Disp: 90 tablet, Rfl: 1    fenofibrate (TRIGLIDE) 160 MG tablet, Take 1 tablet (160 mg total) by mouth daily (Patient not taking: Reported on 11/9/2018 ), Disp: 90 tablet, Rfl: 1    FLUoxetine (PROzac) 20 MG tablet, TAKE 1/2 A TABLET BY MOUTH DAILY (Patient not taking: Reported on 11/9/2018), Disp: 90 tablet, Rfl: 0    fluticasone (FLONASE) 50 mcg/act nasal spray, , Disp: , Rfl:     gabapentin (NEURONTIN) 300 mg capsule, Take 1 capsule (300 mg total) by mouth daily at bedtime (Patient not taking: Reported on 11/9/2018 ), Disp: 30 capsule, Rfl: 0    meloxicam (MOBIC) 15 mg tablet, Take 1 tablet (15 mg total) by mouth daily, Disp: 30 tablet, Rfl: 0    oxyCODONE-acetaminophen (PERCOCET) 7 5-325 MG per tablet, Take 1 tablet by mouth every 12 (twelve) hours Max Daily Amount: 2 tablets, Disp: 30 tablet, Rfl: 0    Objective:    Vitals:    11/09/18 1041   BP: 120/80   BP Location: Right arm   Patient Position: Sitting   Cuff Size: Large   Pulse: 91   Resp: 16   Temp: 99 1 °F (37 3 °C)   TempSrc: Tympanic   SpO2: 97%        Physical Exam   Constitutional: She is oriented to person, place, and time   She appears well-developed and well-nourished  HENT:   Head: Normocephalic and atraumatic  Nose: Nose normal    Mouth/Throat: No oropharyngeal exudate  Eyes: Pupils are equal, round, and reactive to light  Right eye exhibits no discharge  Left eye exhibits no discharge  Neck: Normal range of motion  Neck supple  No tracheal deviation present  Cardiovascular: Normal rate, regular rhythm and intact distal pulses  Exam reveals no gallop and no friction rub  No murmur heard  Pulses:       Dorsalis pedis pulses are 2+ on the right side, and 2+ on the left side  Posterior tibial pulses are 2+ on the right side, and 2+ on the left side  Pulmonary/Chest: Effort normal and breath sounds normal  No respiratory distress  She has no wheezes  She has no rales  Abdominal: Soft  Bowel sounds are normal  She exhibits no distension  There is no tenderness  There is no rebound and no guarding  Musculoskeletal: Normal range of motion  She exhibits no edema  Lymphadenopathy:        Head (right side): No submental and no submandibular adenopathy present  Head (left side): No submental and no submandibular adenopathy present  She has no cervical adenopathy  Right cervical: No superficial cervical, no deep cervical and no posterior cervical adenopathy present  Left cervical: No superficial cervical, no deep cervical and no posterior cervical adenopathy present  Neurological: She is alert and oriented to person, place, and time  No cranial nerve deficit or sensory deficit  Skin: Skin is warm, dry and intact  Psychiatric: Her speech is normal and behavior is normal  Judgment normal  Her mood appears not anxious  Cognition and memory are normal  She does not exhibit a depressed mood  Vitals reviewed

## 2018-11-13 ENCOUNTER — DOCUMENTATION (OUTPATIENT)
Dept: FAMILY MEDICINE CLINIC | Facility: CLINIC | Age: 43
End: 2018-11-13

## 2018-11-13 NOTE — PROGRESS NOTES
7 5-325 MG APPROVED THROUGH [facility] ID# [id number] UNTIL 11/13/2019  LM AT [facility]

## 2018-12-03 DIAGNOSIS — M25.571 CHRONIC PAIN OF RIGHT ANKLE: ICD-10-CM

## 2018-12-03 DIAGNOSIS — K13.79 MASS OF ORAL CAVITY: ICD-10-CM

## 2018-12-03 DIAGNOSIS — G89.29 CHRONIC PAIN OF RIGHT ANKLE: ICD-10-CM

## 2018-12-03 RX ORDER — MELOXICAM 15 MG/1
15 TABLET ORAL DAILY
Qty: 30 TABLET | Refills: 0 | Status: SHIPPED | OUTPATIENT
Start: 2018-12-03 | End: 2019-01-29 | Stop reason: SDUPTHER

## 2018-12-03 RX ORDER — OXYCODONE AND ACETAMINOPHEN 7.5; 325 MG/1; MG/1
1 TABLET ORAL EVERY 12 HOURS
Qty: 30 TABLET | Refills: 0 | Status: SHIPPED | OUTPATIENT
Start: 2018-12-03 | End: 2018-12-19 | Stop reason: SDUPTHER

## 2018-12-19 DIAGNOSIS — E78.5 DYSLIPIDEMIA: ICD-10-CM

## 2018-12-19 DIAGNOSIS — E03.9 HYPOTHYROIDISM, UNSPECIFIED TYPE: ICD-10-CM

## 2018-12-19 DIAGNOSIS — K13.79 MASS OF ORAL CAVITY: ICD-10-CM

## 2018-12-19 RX ORDER — EZETIMIBE 10 MG/1
10 TABLET ORAL DAILY
Qty: 90 TABLET | Refills: 0 | Status: SHIPPED | OUTPATIENT
Start: 2018-12-19 | End: 2019-05-13 | Stop reason: SDUPTHER

## 2018-12-19 RX ORDER — OXYCODONE AND ACETAMINOPHEN 7.5; 325 MG/1; MG/1
1 TABLET ORAL EVERY 12 HOURS
Qty: 30 TABLET | Refills: 0 | Status: SHIPPED | OUTPATIENT
Start: 2018-12-19 | End: 2019-01-09 | Stop reason: SDUPTHER

## 2018-12-19 RX ORDER — LEVOTHYROXINE SODIUM 88 UG/1
88 TABLET ORAL DAILY
Qty: 90 TABLET | Refills: 0 | Status: SHIPPED | OUTPATIENT
Start: 2018-12-19 | End: 2019-03-17 | Stop reason: SDUPTHER

## 2019-01-09 DIAGNOSIS — E78.2 MIXED HYPERLIPIDEMIA: ICD-10-CM

## 2019-01-09 DIAGNOSIS — K13.79 MASS OF ORAL CAVITY: ICD-10-CM

## 2019-01-11 RX ORDER — OXYCODONE AND ACETAMINOPHEN 7.5; 325 MG/1; MG/1
1 TABLET ORAL EVERY 12 HOURS
Qty: 30 TABLET | Refills: 0 | Status: SHIPPED | OUTPATIENT
Start: 2019-01-10 | End: 2019-03-17

## 2019-01-11 RX ORDER — OMEGA-3-ACID ETHYL ESTERS 1 G/1
2 CAPSULE, LIQUID FILLED ORAL 2 TIMES DAILY
Qty: 360 CAPSULE | Refills: 1 | Status: SHIPPED | OUTPATIENT
Start: 2019-01-10 | End: 2020-11-05

## 2019-01-29 DIAGNOSIS — K13.79 MASS OF ORAL CAVITY: ICD-10-CM

## 2019-01-29 DIAGNOSIS — G89.29 CHRONIC PAIN OF RIGHT ANKLE: ICD-10-CM

## 2019-01-29 DIAGNOSIS — M25.571 CHRONIC PAIN OF RIGHT ANKLE: ICD-10-CM

## 2019-01-29 DIAGNOSIS — F41.1 GENERALIZED ANXIETY DISORDER: ICD-10-CM

## 2019-01-29 RX ORDER — OXYCODONE AND ACETAMINOPHEN 7.5; 325 MG/1; MG/1
1 TABLET ORAL EVERY 12 HOURS
Qty: 30 TABLET | Refills: 0 | OUTPATIENT
Start: 2019-01-29

## 2019-01-29 RX ORDER — MELOXICAM 15 MG/1
15 TABLET ORAL DAILY
Qty: 30 TABLET | Refills: 0 | Status: SHIPPED | OUTPATIENT
Start: 2019-01-29 | End: 2019-03-17

## 2019-01-29 RX ORDER — ALPRAZOLAM 0.5 MG/1
TABLET ORAL
Qty: 180 TABLET | Refills: 1 | Status: SHIPPED | OUTPATIENT
Start: 2019-01-29 | End: 2019-04-04

## 2019-01-29 NOTE — TELEPHONE ENCOUNTER
Pt called and would like a referral to Jahaira Flores  She also stated that her cam boot is too big and was not able to return boot  Because boot was already worn  Thank you!

## 2019-02-13 ENCOUNTER — TELEPHONE (OUTPATIENT)
Dept: FAMILY MEDICINE CLINIC | Facility: CLINIC | Age: 44
End: 2019-02-13

## 2019-02-13 NOTE — TELEPHONE ENCOUNTER
Ramo Stahl called the office and left a message stating she received an odd letter form our office stating that she missed/ no showed to her scheduled appointment on 1/31/2019  Pt stated she has been a patient at our practice for over 15 years back when Samuel Deleonell was here and she has never no showed to an appointment she is not like that  Pt thought that it was a referral to ortho and pt stated she did not make  this appointment and ddi not miss this appointment  Would like a note documented into her chart  I spoke to management and we may inform pt that it was an over sight and we will fix it in her chart  It was scheduled back in July of 2018  I called Ramo Stahl and informed her that I got her message and was calling to follow up  It was scheduled back in July of 2018 it could have been an oversight  Pt thought maybe we scheduled pt for blood work  I did advise her it was scheduled with Dr Ken  I informed at this time we do not charge for no showing/ missing an appointment  We just followed our protocol when an individual a patient at our practice has an appointment scheduled and does not come they get a call and or a letter  Pt requests that this [pleas be fixed  She cares about her reputation and does not want this on her chart  I informed pt I would speak to Ethel Footmoi Lobo our   and try to fix it once she is available she is currently in a meeting

## 2019-02-13 NOTE — TELEPHONE ENCOUNTER
I spoke to Kalie Degroot pt had an appointment on 7/30/18 in our office and her appointment  1/31/19 was scheduled at time of check out  On 1/29/2019 [employer] called patient 4 times on 1/29/2019and it was hung up on after answered  Confirm, number and that she prefer communication as phone  Also we can unfortunately not change it it only is an issue when it is consistent  I attempted to call Nagi Bergeron and there was no option and mailbox was full I was not able to leave a message  We have to leave her appointment on 1/31/19 as a nos how we can not fix it  She was physically in the office and checked out when this appoitnmetn was made  [employer] our automated reminder system made 4 attempts to call her on 1/29/2019 it was answered and hung up on each time  A no show does not become a problems until it is constantly happening per Idalmis Nicely that is when there is a problem

## 2019-02-14 NOTE — TELEPHONE ENCOUNTER
I attempted to call Ramo Maribeth and unfortunately it went to voicemail and mail box was full  I will try one more time  Just would like to share that the appointment since it was no showed we can not change it  She had an appointment in the office when it was scheduled  No problems that it was a no show unfortunately we can not change the status of the appointment also would like to confirm her phone number and to make sure that a phone call is her preferred method of contact  [employer] made 4 attempts to contact pt with no luck each time it was hung up on

## 2019-02-15 ENCOUNTER — TELEPHONE (OUTPATIENT)
Dept: FAMILY MEDICINE CLINIC | Facility: CLINIC | Age: 44
End: 2019-02-15

## 2019-02-15 NOTE — TELEPHONE ENCOUNTER
I called and spoke to [facility] I informed her that we can not change the no show appointment  I did confirm pt's preferred method of contact is her cell phone  [employer] called her 4 times and unfortunately it was hung up on  Pt stated there has been many instances where we have failed her regarding receiving her mammogram results and blood work results that she has never received and months have past when she has heard no results  She stated we need to our hold ourselves just as accountable  Per pt so when someone no show's to an appointment we are quick to hold them to the fire but when er make mistakes we do not document that  Example per my conversation with pt that she has been waiting on a referral to ortho and blwk orders since January and she went to [facility] with her daughter and they had no orders  I informed pt I will gladly mail the ortho order to her and if it is ok she will get in about 1-2 weeks  Also her routine fasting blwk and thyroid orders are not in her chart there is clerically some disconnect with communication and clerical errors  She asked when this  appointment was scheduled I did inform her it was scheduled on 7/30/2018 when she saw Grady Rutledge she stated she di not make it  She always remembers her appointments  She would like our  to please call her  Also her routine blwk should be in her chart  I informed I apologize unfortunately they are not and I need to ask Grady Rutledge to place the orders when he sin in on Monday

## 2019-02-15 NOTE — TELEPHONE ENCOUNTER
Ninfa Tavera mentioned she is due for routine fasting blood work and thyroid blood work  Can you please place the orders pt uses [facility] and goes to their [place] location  Pt was informed that you are out of the office today and will be in on Monday  I informed  Ninfa Tavera once orders are placed and I get an answer I will fax her blood work  orders to [facility] in [place] myself  And call to inform I faxed and received confirmation  Please respond to Marjorie Berger I will be in at 11:30 am on Monday 2/18/2019  I placed Obdulia's referral for ortho in the mail today 2/15/2019 prior to the  to come in and [facility]

## 2019-02-16 DIAGNOSIS — E03.9 ACQUIRED HYPOTHYROIDISM: Primary | ICD-10-CM

## 2019-02-16 DIAGNOSIS — R73.03 PREDIABETES: ICD-10-CM

## 2019-02-16 DIAGNOSIS — E78.5 DYSLIPIDEMIA: ICD-10-CM

## 2019-02-18 NOTE — TELEPHONE ENCOUNTER
Blood work orders were faxed to [facility], [phone]  Received confirmation fax went through  Called pt to advise her blood work orders were placed and faxed to [facility]  Pt's mailbox is full and cannot leave a message

## 2019-02-21 NOTE — TELEPHONE ENCOUNTER
I called and spoke to Chayo Daley informed her that the blood work script was faxed to \[facility]\"" in [address]  And that is its fasting

## 2019-03-16 ENCOUNTER — TELEPHONE (OUTPATIENT)
Dept: FAMILY MEDICINE CLINIC | Facility: CLINIC | Age: 44
End: 2019-03-16

## 2019-03-16 DIAGNOSIS — R11.0 NAUSEA: Primary | ICD-10-CM

## 2019-03-16 RX ORDER — ONDANSETRON HYDROCHLORIDE 8 MG/1
8 TABLET, FILM COATED ORAL EVERY 8 HOURS PRN
Qty: 30 TABLET | Refills: 0
Start: 2019-03-16 | End: 2019-03-25

## 2019-03-17 ENCOUNTER — OFFICE VISIT (OUTPATIENT)
Dept: FAMILY MEDICINE CLINIC | Facility: CLINIC | Age: 44
End: 2019-03-17
Payer: COMMERCIAL

## 2019-03-17 VITALS
TEMPERATURE: 97.4 F | HEART RATE: 81 BPM | SYSTOLIC BLOOD PRESSURE: 118 MMHG | OXYGEN SATURATION: 98 % | DIASTOLIC BLOOD PRESSURE: 78 MMHG

## 2019-03-17 DIAGNOSIS — S16.1XXA NECK STRAIN, INITIAL ENCOUNTER: ICD-10-CM

## 2019-03-17 DIAGNOSIS — S06.0X0A CONCUSSION WITHOUT LOSS OF CONSCIOUSNESS, INITIAL ENCOUNTER: Primary | ICD-10-CM
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
## 2019-03-17 DIAGNOSIS — E03.9 HYPOTHYROIDISM, UNSPECIFIED TYPE: ICD-10-CM

## 2019-03-17 DIAGNOSIS — K13.79 MASS OF ORAL CAVITY: ICD-10-CM

## 2019-03-17 PROCEDURE — 1036F TOBACCO NON-USER: CPT | Performed by: PHYSICIAN ASSISTANT

## 2019-03-17 PROCEDURE — 99214 OFFICE O/P EST MOD 30 MIN: CPT | Performed by: PHYSICIAN ASSISTANT

## 2019-03-17 RX ORDER — LEVOTHYROXINE SODIUM 0.1 MG/1
100 TABLET ORAL DAILY
Qty: 30 TABLET | Refills: 0
Start: 2019-03-17 | End: 2019-08-09 | Stop reason: SDUPTHER

## 2019-03-17 RX ORDER — OXYCODONE AND ACETAMINOPHEN 7.5; 325 MG/1; MG/1
1 TABLET ORAL EVERY 12 HOURS
Qty: 30 TABLET | Refills: 0
Start: 2019-03-17 | End: 2019-03-25

## 2019-03-17 RX ORDER — METHOCARBAMOL 750 MG/1
750 TABLET, FILM COATED ORAL EVERY 6 HOURS PRN
Qty: 30 TABLET | Refills: 0 | Status: SHIPPED | OUTPATIENT
Start: 2019-03-17 | End: 2019-03-25

## 2019-03-17 NOTE — PROGRESS NOTES
Assessment/Plan:      Diagnoses and all orders for this visit:    Concussion without loss of consciousness, initial encounter  -     methocarbamol (ROBAXIN) 750 mg tablet; Take 1 tablet (750 mg total) by mouth every 6 (six) hours as needed for muscle spasms    Hypothyroidism, unspecified type  -     levothyroxine 100 mcg tablet; Take 1 tablet (100 mcg total) by mouth daily    Struck by other hit or thrown ball, initial encounter  -     methocarbamol (ROBAXIN) 750 mg tablet; Take 1 tablet (750 mg total) by mouth every 6 (six) hours as needed for muscle spasms    Neck strain, initial encounter  -     methocarbamol (ROBAXIN) 750 mg tablet; Take 1 tablet (750 mg total) by mouth every 6 (six) hours as needed for muscle spasms    Mass of oral cavity  -     oxyCODONE-acetaminophen (PERCOCET) 7 5-325 MG per tablet; Take 1 tablet by mouth every 12 (twelve) hoursMax Daily Amount: 2 tablets      patient is a 78-year-old female presenting today 2 days following getting hit head accidentally very hard by soccer ball at her child's soccer game  Patient states it was unexpected and jerked her head to the left  She is having general headache as well as light sensitivity and nausea  No dizziness but does report some ringing in the ears which has improved since onset  Symptoms was consistent with a concussion  An etiology cause of concussions as well as what happened started caution was discussed with patient in great detail today as well as recommended treatment, common symptoms and prognosis  A thorough neurological exam was conducted on patient today and there is no significant neurological deficits warranting he had for further evaluation with imaging at present  Symptomatic treatment was discussed with patient today including NSAIDs OTC or Tylenol for headache, neck pain and also prescribed her Robaxin for neck stiffness at strain with side effects discussed    I also recommended gentle neck stretches as well as heat pad   She is to monitor headache and symptoms and certainly call ASAP with any worsening or do neurological symptoms  Otherwise I explained headache, feeling out of it as well as the other symptom she has could accompany a concussion and should slowly improve over the next few weeks  Rest, hydration, eating regularly as well as avoiding overstimulation with light from computers, phone or TV were all recommended  I explained to patient that she may feel very fatigued but irritable which is competent  I will have patient follow up with me in 1 week for reassessment of her symptoms to see how she is doing once again certainly she should call with any concerning or worsening symptoms that may require further evaluation  Chief Complaint   Patient presents with    Head Injury     hit in the back of the head with a soccer ball x2 days, ringing in ears, L neck and shoulder pain and stiffness       Subjective:     Patient ID: Mcihelle Scott is a 39 y o  female     38y/o female here todaay for possible concussion  States was standing talking to a few ladies after soccer game 2 days ago and was hit in back of head by someone who kicked soccer ball very hard  States when hit, no LOC, hit right occipital scalp  States initially felt dazed and ringing in ears  Head was turned slightly to left so neck and shoulder pain on left  Headache since, general, even over eyes and occipital head  Ringing in ears continue but less than it was  No dizziness  Some nausea and light sensitivity, "out of it" a little  She tried OTC NSAIDS  Review of Systems   Constitutional: Negative  Eyes: Negative  Respiratory: Negative  Cardiovascular: Negative  Gastrointestinal: Negative  Genitourinary: Negative  Musculoskeletal:        As in HPI   Skin:        As in HPI   Neurological:        As in HPI   Psychiatric/Behavioral: Negative            The following portions of the patient's history were reviewed and updated as appropriate: allergies, current medications, past family history, past medical history, past social history, past surgical history and problem list       Objective:     Physical Exam   Constitutional: She is oriented to person, place, and time  She appears well-developed  No distress  HENT:   Head: Normocephalic  Right Ear: Tympanic membrane and ear canal normal    Left Ear: Tympanic membrane and ear canal normal    Nose: Nose normal    No visible skin discoloration or open wounds or swelling  No tenderness to palpation over affected area that was hit   Eyes: Pupils are equal, round, and reactive to light  Conjunctivae, EOM and lids are normal    Pt does have some light sensitivity during testing   Neck: Neck supple  Carotid bruit is not present  Cardiovascular: Normal rate, regular rhythm and normal heart sounds  Pulmonary/Chest: Effort normal and breath sounds normal    Musculoskeletal:   Neck and upper extremities appearing normal without redness, swelling, ecchymosis or rash  There is some slight tenderness to palpation along the left side of the neck  She has fairly decent range of motion with soreness on left side of the neck with all direction  Normal range of motion and normal strength in bilateral upper extremities  Neurological: She is alert and oriented to person, place, and time  She is not disoriented  She displays no tremor  No cranial nerve deficit or sensory deficit  She exhibits normal muscle tone  Coordination and gait normal    Skin: Skin is intact  Psychiatric: She has a normal mood and affect  Her speech is normal and behavior is normal    Vitals reviewed        Vitals:    03/17/19 0952   BP: 118/78   BP Location: Left arm   Patient Position: Sitting   Pulse: 81   Temp: (!) 97 4 °F (36 3 °C)   TempSrc: Tympanic   SpO2: 98%

## 2019-03-19 ENCOUNTER — TELEPHONE (OUTPATIENT)
Dept: FAMILY MEDICINE CLINIC | Facility: CLINIC | Age: 44
End: 2019-03-19

## 2019-03-19 NOTE — TELEPHONE ENCOUNTER
Pt called left msg on front line asking about her concussion she is still having headaches with light sensitively is this normal pls advise

## 2019-03-19 NOTE — TELEPHONE ENCOUNTER
Please remind patient that headaches can persistent with other sxs post-concussion for several weeks  She should see very slow improvement of sxs and my hope is sxs will be mostly resolved within the next 2-3 weeks  She should rest, stay hydrated, can continue tylenol or motrin for headache control as needed  Pt needs to let us know if anything WORSENS  Please assess pt to make sure nothing has worsened   thanks

## 2019-03-21 ENCOUNTER — TELEPHONE (OUTPATIENT)
Dept: FAMILY MEDICINE CLINIC | Facility: CLINIC | Age: 44
End: 2019-03-21

## 2019-03-21 NOTE — TELEPHONE ENCOUNTER
Pt called into the office and stated she was here to see Mine Rodriges on 3/17/19 for a head injury from a soccer ball  Pt stated she called 2 days ago and never received a call back from our office  She wanted to get an appt with you, but you are full today  I did ask pt if she would like to see another provider and she refused  Pt still has Nausea, headaches sensitive to light  I asked pt if she was doing her neck stretches and heating pad  Pt stated that she is using her heating pad, but is not able to stretch her neck  I also asked pt if she was still taking her Robaxin she stated to me no that they were not helping and went back to taking NSAIDS  Please advise  Thank you!

## 2019-03-21 NOTE — TELEPHONE ENCOUNTER
I spoke with patient at 8:30 this morning and gave her Tristin Driscoll message  Patient requested an appointment for today regarding her concussion  Appt with Delano Mckoy was scheduled for this afternoon

## 2019-03-21 NOTE — TELEPHONE ENCOUNTER
I spoke with patient today at 8:30 a m  I gave her the message from [facility]  She stated she wanted an appointment for today for her concussion  Due to appointment availability, [facility]

## 2019-03-22 ENCOUNTER — TELEPHONE (OUTPATIENT)
Dept: FAMILY MEDICINE CLINIC | Facility: CLINIC | Age: 44
End: 2019-03-22

## 2019-03-22 NOTE — TELEPHONE ENCOUNTER
Fax came through [employer] from [facility] regarding PT Outpatient Summary  Form is in your Forms folder on your desk to review

## 2019-03-25 ENCOUNTER — OFFICE VISIT (OUTPATIENT)
Dept: FAMILY MEDICINE CLINIC | Facility: CLINIC | Age: 44
End: 2019-03-25
Payer: COMMERCIAL

## 2019-03-25 VITALS
OXYGEN SATURATION: 98 % | HEART RATE: 111 BPM | RESPIRATION RATE: 17 BRPM | SYSTOLIC BLOOD PRESSURE: 130 MMHG | DIASTOLIC BLOOD PRESSURE: 90 MMHG | TEMPERATURE: 98.6 F

## 2019-03-25 DIAGNOSIS — G44.209 ACUTE NON INTRACTABLE TENSION-TYPE HEADACHE: ICD-10-CM

## 2019-03-25 DIAGNOSIS — R11.0 NAUSEA: ICD-10-CM

## 2019-03-25 DIAGNOSIS — S16.1XXD NECK STRAIN, SUBSEQUENT ENCOUNTER: ICD-10-CM

## 2019-03-25 DIAGNOSIS — S06.0X0D CONCUSSION WITHOUT LOSS OF CONSCIOUSNESS, SUBSEQUENT ENCOUNTER: Primary | ICD-10-CM

## 2019-03-25 PROCEDURE — 99214 OFFICE O/P EST MOD 30 MIN: CPT | Performed by: PHYSICIAN ASSISTANT

## 2019-03-25 PROCEDURE — 1036F TOBACCO NON-USER: CPT | Performed by: PHYSICIAN ASSISTANT

## 2019-03-25 RX ORDER — DIAZEPAM 5 MG/1
5 TABLET ORAL EVERY 8 HOURS PRN
Qty: 30 TABLET | Refills: 0 | Status: SHIPPED | OUTPATIENT
Start: 2019-03-25 | End: 2019-04-04 | Stop reason: SDUPTHER

## 2019-03-25 RX ORDER — ONDANSETRON HYDROCHLORIDE 8 MG/1
8 TABLET, FILM COATED ORAL EVERY 8 HOURS PRN
Qty: 30 TABLET | Refills: 0 | Status: CANCELLED | OUTPATIENT
Start: 2019-03-25

## 2019-03-25 RX ORDER — ONDANSETRON 8 MG/1
8 TABLET, ORALLY DISINTEGRATING ORAL EVERY 8 HOURS PRN
Qty: 30 TABLET | Refills: 0 | Status: SHIPPED | OUTPATIENT
Start: 2019-03-25 | End: 2020-11-05

## 2019-03-25 RX ORDER — PREDNISONE 10 MG/1
TABLET ORAL
Qty: 42 TABLET | Refills: 0 | Status: SHIPPED | OUTPATIENT
Start: 2019-03-25 | End: 2019-09-17 | Stop reason: ALTCHOICE

## 2019-03-25 NOTE — PROGRESS NOTES
Assessment/Plan:      Diagnoses and all orders for this visit:    Concussion without loss of consciousness, subsequent encounter  -     ondansetron (ZOFRAN-ODT) 8 mg disintegrating tablet; Take 1 tablet (8 mg total) by mouth every 8 (eight) hours as needed for nausea or vomiting  -     diazepam (VALIUM) 5 mg tablet; Take 1 tablet (5 mg total) by mouth every 8 (eight) hours as needed for muscle spasms No xanax while taking valium  -     predniSONE 10 mg tablet; 6,6,5,5,4,4,3,3,2,2,1,1    Acute non intractable tension-type headache  -     ondansetron (ZOFRAN-ODT) 8 mg disintegrating tablet; Take 1 tablet (8 mg total) by mouth every 8 (eight) hours as needed for nausea or vomiting  -     diazepam (VALIUM) 5 mg tablet; Take 1 tablet (5 mg total) by mouth every 8 (eight) hours as needed for muscle spasms No xanax while taking valium  -     predniSONE 10 mg tablet; 6,6,5,5,4,4,3,3,2,2,1,1    Neck strain, subsequent encounter  -     ondansetron (ZOFRAN-ODT) 8 mg disintegrating tablet; Take 1 tablet (8 mg total) by mouth every 8 (eight) hours as needed for nausea or vomiting  -     diazepam (VALIUM) 5 mg tablet; Take 1 tablet (5 mg total) by mouth every 8 (eight) hours as needed for muscle spasms No xanax while taking valium  -     predniSONE 10 mg tablet; 6,6,5,5,4,4,3,3,2,2,1,1    Nausea  -     ondansetron (ZOFRAN-ODT) 8 mg disintegrating tablet; Take 1 tablet (8 mg total) by mouth every 8 (eight) hours as needed for nausea or vomiting  -     diazepam (VALIUM) 5 mg tablet; Take 1 tablet (5 mg total) by mouth every 8 (eight) hours as needed for muscle spasms No xanax while taking valium  -     predniSONE 10 mg tablet; 6,6,5,5,4,4,3,3,2,2,1,1    Other orders  -     Cancel: ondansetron (ZOFRAN) 8 mg tablet; Take 1 tablet (8 mg total) by mouth every 8 (eight) hours as needed for nausea or vomiting      patient is a 50-year-old female presenting today for 1 week follow-up to a concussion    She was at her son's soccer game and she states someone accidentally kicked a soccer ball very hard into the back of her neck and head joltting her neck and head to the left side  Patient has symptoms consistent of a persistent postconcussion syndrome including persistent headache, light and sound sensitivity, nausea and some dizziness as well as fatigue  She is not exhibiting any new neurological symptoms and there are no significant neurological abnormalities on the exam   She is experiencing some worsening left neck, left shoulder and left upper scapular pain which could be muscle strain from the jolting injury when the ball hit her  I do not see any immediate need for imaging at this time  Patient already has taken the liberty of consultation with [facility] and will continue them for management of the musculoskeletal symptoms  Unfortunately patient is not getting relief with OTC NSAIDs or the Robaxin  I will have her temporarily discontinue her Xanax and will switch her to a 12 day taper of prednisone as well as a course of Valium in hopes this will help headache and help relax muscles with potential side effects of each discussed  She will continue seeing [facility]  Patient requesting Zofran dispersible which was filled for her today  She was advised to continue to rest and avoid bright lighting with cell phones, TV or computer as well as hydrating with plenty of water  I encouraged patient to call in a week or so if she is still no better and I would like to consider referring to concussion Clinic for further assistance with helping patient with her symptoms and treatment plan  Chief Complaint   Patient presents with    Follow-up     pt here for her 1 week recheck for her concussion       Subjective:     Patient ID: Jodie Argueta is a 39 y o  female     40y/o female here today for f/u to concussion   She states she now has been getting pain in back of neck on left as well as into her left shoulder and scapula region and decided to go to PT herself and went to [facility]  States neck and shoulder is worse  She continues with headaches, nausea, light and sound sensitivity but no worse  No significant dizziness, disorientation though states sometimes forgets things or trouble concentrating  Was given exercises to do at home  Has been taking motrin, ibuprofen tylenol  Not sure if robaxin helping neck  Some motion sickness in car  Review of Systems   Constitutional: Positive for fatigue  Eyes: Positive for photophobia  Respiratory: Negative  Cardiovascular: Negative  Gastrointestinal: Positive for nausea  Genitourinary: Negative  Neurological: Positive for dizziness and headaches  Negative for tremors, weakness, light-headedness and numbness  As in HPI   Psychiatric/Behavioral: Negative  The following portions of the patient's history were reviewed and updated as appropriate: allergies, current medications, past family history, past medical history, past social history, past surgical history and problem list       Objective:     Physical Exam   Constitutional: She is oriented to person, place, and time  She appears well-developed  No distress  HENT:   Head: Normocephalic and atraumatic  Head is without contusion, without laceration, without right periorbital erythema and without left periorbital erythema  Hair is normal    Right Ear: Hearing and tympanic membrane normal    Left Ear: Hearing and tympanic membrane normal    Nose: Nose normal    Eyes: Pupils are equal, round, and reactive to light  Conjunctivae, EOM and lids are normal    Light sensitivity   Neck: Normal carotid pulses present  Muscular tenderness (left side) present  Carotid bruit is not present  Decreased range of motion (pain left neck and left upper back in all directions) present  Cardiovascular: Normal rate, regular rhythm and normal heart sounds     Pulmonary/Chest: Effort normal and breath sounds normal  Musculoskeletal:   Pt has decreased AROM of left shoulder with lifting above head  Strength in UE's is normal and symmetric   Lymphadenopathy:     She has no cervical adenopathy  Neurological: She is alert and oriented to person, place, and time  She displays no tremor  No cranial nerve deficit or sensory deficit  Coordination and gait normal    Skin: Skin is intact  Psychiatric: She has a normal mood and affect  Her speech is normal and behavior is normal    Vitals reviewed        Vitals:    03/25/19 1423   BP: 130/90   BP Location: Left arm   Patient Position: Sitting   Cuff Size: Adult   Pulse: (!) 111   Resp: 17   Temp: 98 6 °F (37 °C)   TempSrc: Tympanic   SpO2: 98%

## 2019-04-04 DIAGNOSIS — G44.209 ACUTE NON INTRACTABLE TENSION-TYPE HEADACHE: ICD-10-CM

## 2019-04-04 DIAGNOSIS — S06.0X0D CONCUSSION WITHOUT LOSS OF CONSCIOUSNESS, SUBSEQUENT ENCOUNTER: ICD-10-CM

## 2019-04-04 DIAGNOSIS — S16.1XXD NECK STRAIN, SUBSEQUENT ENCOUNTER: ICD-10-CM

## 2019-04-04 DIAGNOSIS — R11.0 NAUSEA: ICD-10-CM

## 2019-04-04 DIAGNOSIS — R68.89 FLU-LIKE SYMPTOMS: Primary | ICD-10-CM

## 2019-04-04 RX ORDER — DIAZEPAM 5 MG/1
5 TABLET ORAL EVERY 8 HOURS PRN
Qty: 30 TABLET | Refills: 0 | Status: SHIPPED | OUTPATIENT
Start: 2019-04-04 | End: 2019-09-17

## 2019-04-04 RX ORDER — OSELTAMIVIR PHOSPHATE 75 MG/1
75 CAPSULE ORAL EVERY 12 HOURS SCHEDULED
Qty: 10 CAPSULE | Refills: 0 | Status: SHIPPED | OUTPATIENT
Start: 2019-04-04 | End: 2019-04-09

## 2019-05-13 DIAGNOSIS — E78.5 DYSLIPIDEMIA: ICD-10-CM

## 2019-05-13 RX ORDER — EZETIMIBE 10 MG/1
10 TABLET ORAL DAILY
Qty: 90 TABLET | Refills: 0 | Status: SHIPPED | OUTPATIENT
Start: 2019-05-13 | End: 2019-09-26 | Stop reason: SDUPTHER

## 2019-05-14 ENCOUNTER — TELEPHONE (OUTPATIENT)
Dept: FAMILY MEDICINE CLINIC | Facility: CLINIC | Age: 44
End: 2019-05-14

## 2019-05-17 ENCOUNTER — TELEPHONE (OUTPATIENT)
Dept: FAMILY MEDICINE CLINIC | Facility: CLINIC | Age: 44
End: 2019-05-17

## 2019-08-09 DIAGNOSIS — E03.9 HYPOTHYROIDISM, UNSPECIFIED TYPE: ICD-10-CM

## 2019-08-09 DIAGNOSIS — E03.9 HYPOTHYROIDISM, UNSPECIFIED TYPE: Primary | ICD-10-CM

## 2019-08-09 DIAGNOSIS — F41.9 ANXIETY: Primary | ICD-10-CM

## 2019-08-09 DIAGNOSIS — D68.9 COAGULATION DEFECT (HCC): ICD-10-CM

## 2019-08-09 DIAGNOSIS — E78.5 DYSLIPIDEMIA: ICD-10-CM

## 2019-08-09 DIAGNOSIS — F41.1 GENERALIZED ANXIETY DISORDER: ICD-10-CM

## 2019-08-09 DIAGNOSIS — Z13.0 SCREENING FOR IRON DEFICIENCY ANEMIA: ICD-10-CM

## 2019-08-09 DIAGNOSIS — R73.03 PREDIABETES: ICD-10-CM

## 2019-08-09 RX ORDER — ALPRAZOLAM 0.5 MG/1
TABLET ORAL
COMMUNITY
Start: 2015-01-22 | End: 2019-08-09 | Stop reason: SDUPTHER

## 2019-08-09 RX ORDER — ALPRAZOLAM 0.5 MG/1
TABLET ORAL
Qty: 180 TABLET | OUTPATIENT
Start: 2019-08-09

## 2019-08-09 RX ORDER — LEVOTHYROXINE SODIUM 0.1 MG/1
100 TABLET ORAL DAILY
Qty: 30 TABLET | Refills: 1 | Status: SHIPPED | OUTPATIENT
Start: 2019-08-09 | End: 2019-09-27 | Stop reason: SDUPTHER

## 2019-08-09 RX ORDER — ALPRAZOLAM 0.5 MG/1
0.5 TABLET ORAL 2 TIMES DAILY PRN
Qty: 180 TABLET | Refills: 1 | Status: SHIPPED | OUTPATIENT
Start: 2019-08-09 | End: 2020-02-01

## 2019-08-09 NOTE — TELEPHONE ENCOUNTER
Pt called requesting refills to [facility], and she is also asking for orders to have her routine BW done      PDMP checked for Xanax last filled 5/11/2019

## 2019-09-17 ENCOUNTER — OFFICE VISIT (OUTPATIENT)
Dept: FAMILY MEDICINE CLINIC | Facility: CLINIC | Age: 44
End: 2019-09-17
Payer: COMMERCIAL

## 2019-09-17 VITALS
RESPIRATION RATE: 17 BRPM | TEMPERATURE: 97.8 F | SYSTOLIC BLOOD PRESSURE: 130 MMHG | OXYGEN SATURATION: 98 % | HEART RATE: 85 BPM | DIASTOLIC BLOOD PRESSURE: 88 MMHG

## 2019-09-17 DIAGNOSIS — M25.571 CHRONIC PAIN OF RIGHT ANKLE: Primary | ICD-10-CM

## 2019-09-17 DIAGNOSIS — F41.1 GAD (GENERALIZED ANXIETY DISORDER): ICD-10-CM

## 2019-09-17 DIAGNOSIS — G89.29 CHRONIC PAIN OF RIGHT ANKLE: Primary | ICD-10-CM

## 2019-09-17 PROBLEM — S53.429A: Status: ACTIVE | Noted: 2017-01-06

## 2019-09-17 PROBLEM — D48.1 GIANT CELL TUMOR OF TENDON SHEATH: Status: ACTIVE | Noted: 2019-09-17

## 2019-09-17 PROBLEM — D48.19 GIANT CELL TUMOR OF TENDON SHEATH: Status: ACTIVE | Noted: 2019-09-17

## 2019-09-17 PROCEDURE — 99215 OFFICE O/P EST HI 40 MIN: CPT | Performed by: FAMILY MEDICINE

## 2019-09-17 RX ORDER — PANTOPRAZOLE SODIUM 40 MG/1
TABLET, DELAYED RELEASE ORAL
COMMUNITY
End: 2020-11-05

## 2019-09-17 RX ORDER — ATORVASTATIN CALCIUM 10 MG/1
TABLET, FILM COATED ORAL
COMMUNITY
End: 2020-11-05

## 2019-09-17 RX ORDER — CITALOPRAM 20 MG/1
20 TABLET ORAL DAILY
Qty: 30 TABLET | Refills: 2 | Status: SHIPPED | OUTPATIENT
Start: 2019-09-17 | End: 2019-10-11 | Stop reason: SDUPTHER

## 2019-09-17 RX ORDER — TRAMADOL HYDROCHLORIDE 50 MG/1
TABLET ORAL
COMMUNITY
End: 2020-08-11

## 2019-09-17 NOTE — PROGRESS NOTES
Assessment/Plan:   1  FRAN (generalized anxiety disorder)  Reviewed patient's symptoms today  At this time, symptoms appear likely secondary to multiple home stressors  She was advised that she would highly benefit from seeing a psychologist   She will look to see a counselor for further relief  She may continue with her alprazolam however her 2nd given her significant anxiety, she was advised that she would highly benefit from SSRI treatment  Will start treatment with citalopram 20 mg daily  Follow up with patient in 1 month  - citalopram (CeleXA) 20 mg tablet; Take 1 tablet (20 mg total) by mouth daily  Dispense: 30 tablet; Refill: 2    2  Chronic pain of right ankle  Patient has been having persistent problems with her chronic right ankle pain  Given the persistence of her symptoms, will refer patient to Orthopedics for further evaluation  - Ambulatory referral to Orthopedic Surgery; Future     There are no diagnoses linked to this encounter  Subjective:    Chief Complaint   Patient presents with    Personal Problem        Patient ID: Shannon Moreno is a 38 y o  female  Patient is a 25-year-old female presents today with a CC of significant anxiety for the past few weeks  Symptoms started gradually  She states that she has been having large stressors secondary to social problems 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   She has been having trouble with coaches to have been inappropriate with children  This has been causing significant stress for patient  She states that she has been secluded herself  She does feel panic attacks frequently  She currently has not been taking her alprazolam for symptom relief  Review of Systems   Constitutional: Negative for activity change, chills, fatigue and fever  HENT: Negative for congestion, ear pain, sinus pressure and sore throat  Eyes: Negative for redness, itching and visual disturbance     Respiratory: Negative for cough and shortness of breath  Cardiovascular: Negative for chest pain and palpitations  Gastrointestinal: Negative for abdominal pain, diarrhea and nausea  Endocrine: Negative for cold intolerance and heat intolerance  Genitourinary: Negative for dysuria, flank pain and frequency  Musculoskeletal: Negative for arthralgias, back pain, gait problem and myalgias  Skin: Negative for color change  Allergic/Immunologic: Negative for environmental allergies  Neurological: Negative for dizziness, numbness and headaches  Psychiatric/Behavioral: Negative for behavioral problems and sleep disturbance  The following portions of the patient's history were reviewed and updated as appropriate : past family history, past medical history, past social history and past surgical history        Current Outpatient Medications:     ALPRAZolam (XANAX) 0 5 mg tablet, Take 1 tablet (0 5 mg total) by mouth 2 (two) times a day as needed for anxiety, Disp: 180 tablet, Rfl: 1    aspirin 81 MG tablet, Take 1 tablet by mouth daily, Disp: , Rfl:     atorvastatin (LIPITOR) 10 mg tablet, atorvastatin 10 mg tablet  TAKE 1 TABLET DAILY AT BEDTIME , Disp: , Rfl:     diazepam (VALIUM) 5 mg tablet, Take 1 tablet (5 mg total) by mouth every 8 (eight) hours as needed for muscle spasms No xanax while taking valium, Disp: 30 tablet, Rfl: 0    fluticasone (FLONASE) 50 mcg/act nasal spray, , Disp: , Rfl:     hydrOXYzine pamoate (VISTARIL) 50 mg capsule, TAKE 1 CAPSULE 3 TIMES DAILY AS NEEDED FOR ANXIETY, Disp: 90 capsule, Rfl: 0    levothyroxine 100 mcg tablet, Take 1 tablet (100 mcg total) by mouth daily, Disp: 30 tablet, Rfl: 1    omega-3-acid ethyl esters (LOVAZA) 1 g capsule, Take 2 capsules (2 g total) by mouth 2 (two) times a day, Disp: 360 capsule, Rfl: 1    ondansetron (ZOFRAN-ODT) 8 mg disintegrating tablet, Take 1 tablet (8 mg total) by mouth every 8 (eight) hours as needed for nausea or vomiting, Disp: 30 tablet, Rfl: 0    pantoprazole (PROTONIX) 40 mg tablet, pantoprazole 40 mg tablet,delayed release, Disp: , Rfl:     Sertraline HCl (ZOLOFT PO), sertraline, Disp: , Rfl:     traMADol (ULTRAM) 50 mg tablet, tramadol 50 mg tablet, Disp: , Rfl:     ezetimibe (ZETIA) 10 mg tablet, Take 1 tablet (10 mg total) by mouth daily for 96 days, Disp: 90 tablet, Rfl: 0    Objective:    Vitals:    09/17/19 1057   BP: 130/88   BP Location: Left arm   Patient Position: Sitting   Cuff Size: Large   Pulse: 85   Resp: 17   Temp: 97 8 °F (36 6 °C)   TempSrc: Tympanic   SpO2: 98%        Physical Exam   Constitutional: She is oriented to person, place, and time  She appears well-developed and well-nourished  HENT:   Head: Normocephalic and atraumatic  Nose: Nose normal    Mouth/Throat: No oropharyngeal exudate  Eyes: Pupils are equal, round, and reactive to light  Right eye exhibits no discharge  Left eye exhibits no discharge  Neck: Normal range of motion  Neck supple  No tracheal deviation present  Cardiovascular: Normal rate, regular rhythm and intact distal pulses  Exam reveals no gallop and no friction rub  No murmur heard  Pulses:       Dorsalis pedis pulses are 2+ on the right side, and 2+ on the left side  Posterior tibial pulses are 2+ on the right side, and 2+ on the left side  Pulmonary/Chest: Effort normal and breath sounds normal  No respiratory distress  She has no wheezes  She has no rales  Abdominal: Soft  Bowel sounds are normal  She exhibits no distension  There is no tenderness  There is no rebound and no guarding  Musculoskeletal: Normal range of motion  She exhibits no edema  Lymphadenopathy:        Head (right side): No submental and no submandibular adenopathy present  Head (left side): No submental and no submandibular adenopathy present  She has no cervical adenopathy  Right cervical: No superficial cervical, no deep cervical and no posterior cervical adenopathy present  Left cervical: No superficial cervical, no deep cervical and no posterior cervical adenopathy present  Neurological: She is alert and oriented to person, place, and time  No cranial nerve deficit or sensory deficit  Skin: Skin is warm, dry and intact  Psychiatric: Her speech is normal and behavior is normal  Judgment normal  Her mood appears not anxious  Cognition and memory are normal  She does not exhibit a depressed mood  Vitals reviewed  I have spent 45 minutes with Patient  today in which greater than 50% of this time was spent in counseling/coordination of care regarding Prognosis, Risks and benefits of tx options, Intructions for management and Patient and family education

## 2019-09-26 DIAGNOSIS — E78.5 DYSLIPIDEMIA: ICD-10-CM

## 2019-09-26 RX ORDER — EZETIMIBE 10 MG/1
10 TABLET ORAL DAILY
Qty: 90 TABLET | Refills: 0 | Status: SHIPPED | OUTPATIENT
Start: 2019-09-26 | End: 2020-01-27

## 2019-09-27 DIAGNOSIS — E03.9 HYPOTHYROIDISM, UNSPECIFIED TYPE: ICD-10-CM

## 2019-09-27 RX ORDER — LEVOTHYROXINE SODIUM 0.1 MG/1
100 TABLET ORAL DAILY
Qty: 90 TABLET | Refills: 1 | Status: SHIPPED | OUTPATIENT
Start: 2019-09-27 | End: 2020-04-27

## 2019-10-11 DIAGNOSIS — F41.1 GAD (GENERALIZED ANXIETY DISORDER): ICD-10-CM

## 2019-10-11 RX ORDER — CITALOPRAM 20 MG/1
TABLET ORAL
Qty: 30 TABLET | Refills: 2 | Status: SHIPPED | OUTPATIENT
Start: 2019-10-11 | End: 2020-01-12

## 2019-10-15 ENCOUNTER — TELEPHONE (OUTPATIENT)
Dept: FAMILY MEDICINE CLINIC | Facility: CLINIC | Age: 44
End: 2019-10-15

## 2020-01-12 DIAGNOSIS — F41.1 GAD (GENERALIZED ANXIETY DISORDER): ICD-10-CM

## 2020-01-12 RX ORDER — CITALOPRAM 20 MG/1
TABLET ORAL
Qty: 90 TABLET | Refills: 0 | Status: SHIPPED | OUTPATIENT
Start: 2020-01-12 | End: 2020-05-22

## 2020-01-26 DIAGNOSIS — E78.5 DYSLIPIDEMIA: ICD-10-CM

## 2020-01-27 RX ORDER — EZETIMIBE 10 MG/1
10 TABLET ORAL DAILY
Qty: 30 TABLET | Refills: 0 | Status: SHIPPED | OUTPATIENT
Start: 2020-01-27 | End: 2020-03-12 | Stop reason: SDUPTHER

## 2020-01-31 ENCOUNTER — TELEPHONE (OUTPATIENT)
Dept: FAMILY MEDICINE CLINIC | Facility: CLINIC | Age: 45
End: 2020-01-31

## 2020-01-31 DIAGNOSIS — F41.9 ANXIETY: ICD-10-CM

## 2020-01-31 NOTE — TELEPHONE ENCOUNTER
Pt needs a refill on         ALPRAZOLAM(XANAX) 0 5 MG TAB  DOSE 0 5MG          [facility]

## 2020-02-01 RX ORDER — ALPRAZOLAM 0.5 MG/1
0.5 TABLET ORAL 2 TIMES DAILY PRN
Qty: 180 TABLET | Refills: 0 | Status: SHIPPED | OUTPATIENT
Start: 2020-02-01 | End: 2020-02-03 | Stop reason: SDUPTHER

## 2020-02-03 DIAGNOSIS — F41.9 ANXIETY: ICD-10-CM

## 2020-02-03 RX ORDER — ALPRAZOLAM 0.5 MG/1
0.5 TABLET ORAL 2 TIMES DAILY PRN
Qty: 180 TABLET | Refills: 0 | Status: SHIPPED | OUTPATIENT
Start: 2020-02-03 | End: 2020-04-30 | Stop reason: SDUPTHER

## 2020-02-20 ENCOUNTER — OFFICE VISIT (OUTPATIENT)
Dept: FAMILY MEDICINE CLINIC | Facility: CLINIC | Age: 45
End: 2020-02-20
Payer: COMMERCIAL

## 2020-02-20 ENCOUNTER — APPOINTMENT (OUTPATIENT)
Dept: RADIOLOGY | Facility: CLINIC | Age: 45
End: 2020-02-20
Payer: COMMERCIAL

## 2020-02-20 VITALS
TEMPERATURE: 99.3 F | DIASTOLIC BLOOD PRESSURE: 80 MMHG | OXYGEN SATURATION: 98 % | SYSTOLIC BLOOD PRESSURE: 120 MMHG | HEART RATE: 96 BPM

## 2020-02-20 DIAGNOSIS — B35.1 ONYCHOMYCOSIS: ICD-10-CM

## 2020-02-20 DIAGNOSIS — M54.2 CERVICALGIA: ICD-10-CM

## 2020-02-20 DIAGNOSIS — R73.03 PREDIABETES: ICD-10-CM

## 2020-02-20 DIAGNOSIS — Z12.39 SCREENING FOR BREAST CANCER: ICD-10-CM

## 2020-02-20 DIAGNOSIS — E03.9 HYPOTHYROIDISM, UNSPECIFIED TYPE: ICD-10-CM

## 2020-02-20 DIAGNOSIS — Z13.0 SCREENING FOR IRON DEFICIENCY ANEMIA: ICD-10-CM

## 2020-02-20 DIAGNOSIS — Z00.00 ANNUAL PHYSICAL EXAM: Primary | ICD-10-CM

## 2020-02-20 DIAGNOSIS — E78.5 DYSLIPIDEMIA: ICD-10-CM

## 2020-02-20 PROCEDURE — 99396 PREV VISIT EST AGE 40-64: CPT | Performed by: FAMILY MEDICINE

## 2020-02-20 PROCEDURE — 72050 X-RAY EXAM NECK SPINE 4/5VWS: CPT

## 2020-02-20 NOTE — PROGRESS NOTES
[facility]    NAME: Lynette Civil  AGE: 38 y o  SEX: female  : 1975     DATE: 2020     Assessment and Plan:     Problem List Items Addressed This Visit     None      Visit Diagnoses     Screening for breast cancer    -  Primary    Annual physical exam              Immunizations and preventive care screenings were discussed with patient today  Appropriate education was printed on patient's after visit summary  Counseling:  Alcohol/drug use: discussed moderation in alcohol intake, the recommendations for healthy alcohol use, and avoidance of illicit drug use  Dental Health: discussed importance of regular tooth brushing, flossing, and dental visits  · Exercise: the importance of regular exercise/physical activity was discussed  Recommend exercise 3-5 times per week for at least 30 minutes  BMI Counseling: BMI was not able to be calculated due to patient refusing height and/or weight  Return in 1 year (on 2021)  Chief Complaint:     Chief Complaint   Patient presents with    Annual Exam     Pt is here for a physical exam        History of Present Illness:     Adult Annual Physical   Patient here for a comprehensive physical exam  The patient reports no problems  Diet and Physical Activity  · Diet/Nutrition: well balanced diet  · Exercise: no formal exercise  Depression Screening  PHQ-9 Depression Screening    PHQ-9:    Frequency of the following problems over the past two weeks:       Little interest or pleasure in doing things:  0 - not at all  Feeling down, depressed, or hopeless:  0 - not at all  PHQ-2 Score:  0       General Health  · Sleep: sleeps well  · Hearing: normal - bilateral   · Vision: no vision problems  · Dental: regular dental visits         /GYN Health  · Patient is: premenopausal  ·      Review of Systems:     Review of Systems   Constitutional: Negative for activity change, chills, fatigue and fever  HENT: Negative for congestion, ear pain, sinus pressure and sore throat  Eyes: Negative for redness, itching and visual disturbance  Respiratory: Negative for cough and shortness of breath  Cardiovascular: Negative for chest pain and palpitations  Gastrointestinal: Negative for abdominal pain, diarrhea and nausea  Endocrine: Negative for cold intolerance and heat intolerance  Genitourinary: Negative for dysuria, flank pain and frequency  Musculoskeletal: Negative for arthralgias, back pain, gait problem and myalgias  Skin: Negative for color change  Allergic/Immunologic: Negative for environmental allergies  Neurological: Negative for dizziness, numbness and headaches  Psychiatric/Behavioral: Negative for behavioral problems and sleep disturbance  Past Medical History:     History reviewed  No pertinent past medical history  Past Surgical History:     History reviewed  No pertinent surgical history     Social History:     E-Cigarette/Vaping    E-Cigarette Use Never User      E-Cigarette/Vaping Substances    Nicotine No     THC No     CBD No     Flavoring No     Other No     Unknown No      Social History     Socioeconomic History    Marital status: /Civil Union     Spouse name: None    Number of children: None    Years of education: None    Highest education level: None   Occupational History    None   Social Needs    Financial resource strain: None    Food insecurity:     Worry: None     Inability: None    Transportation needs:     Medical: None     Non-medical: None   Tobacco Use    Smoking status: Never Smoker    Smokeless tobacco: Never Used   Substance and Sexual Activity    Alcohol use: No    Drug use: No    Sexual activity: Yes     Partners: Male   Lifestyle    Physical activity:     Days per week: None     Minutes per session: None    Stress: None   Relationships    Social connections:     Talks on phone: None     Gets together: None     Attends Restorationist service: None     Active member of club or organization: None     Attends meetings of clubs or organizations: None     Relationship status: None    Intimate partner violence:     Fear of current or ex partner: None     Emotionally abused: None     Physically abused: None     Forced sexual activity: None   Other Topics Concern    None   Social History Narrative    None      Family History:     Family History   Problem Relation Age of Onset    Diabetes Mother     Hypertension Mother     Lung cancer Father       Current Medications:     Current Outpatient Medications   Medication Sig Dispense Refill    ALPRAZolam (XANAX) 0 5 mg tablet Take 1 tablet (0 5 mg total) by mouth 2 (two) times a day as needed for anxiety 180 tablet 0    aspirin 81 MG tablet Take 1 tablet by mouth daily      citalopram (CeleXA) 20 mg tablet TAKE 1 TABLET BY MOUTH EVERY DAY 90 tablet 0    ezetimibe (ZETIA) 10 mg tablet TAKE 1 TABLET (10 MG TOTAL) BY MOUTH DAILY FOR 96 DAYS 30 tablet 0    levothyroxine 100 mcg tablet Take 1 tablet (100 mcg total) by mouth daily 90 tablet 1    atorvastatin (LIPITOR) 10 mg tablet atorvastatin 10 mg tablet   TAKE 1 TABLET DAILY AT BEDTIME   fluticasone (FLONASE) 50 mcg/act nasal spray       omega-3-acid ethyl esters (LOVAZA) 1 g capsule Take 2 capsules (2 g total) by mouth 2 (two) times a day (Patient not taking: Reported on 2/20/2020) 360 capsule 1    ondansetron (ZOFRAN-ODT) 8 mg disintegrating tablet Take 1 tablet (8 mg total) by mouth every 8 (eight) hours as needed for nausea or vomiting (Patient not taking: Reported on 2/20/2020) 30 tablet 0    pantoprazole (PROTONIX) 40 mg tablet pantoprazole 40 mg tablet,delayed release      Sertraline HCl (ZOLOFT PO) sertraline      traMADol (ULTRAM) 50 mg tablet tramadol 50 mg tablet       No current facility-administered medications for this visit  Allergies:      Allergies   Allergen Reactions    Clindamycin     Codeine Hives and Other (See Comments)     hives, dilaudid ok  hives, dilaudid ok    Other Other (See Comments)     morphine=hives    Penicillins Hives and Other (See Comments)    Red Dye     Morphine Hives      Physical Exam:     /80 (BP Location: Left arm, Patient Position: Sitting, Cuff Size: Adult)   Pulse 96   Temp 99 3 °F (37 4 °C) (Tympanic)   LMP 01/21/2020   SpO2 98%   Breastfeeding No     Physical Exam   Constitutional: She is oriented to person, place, and time  She appears well-developed and well-nourished  HENT:   Head: Normocephalic and atraumatic  Nose: Nose normal    Mouth/Throat: No oropharyngeal exudate  Eyes: Pupils are equal, round, and reactive to light  Right eye exhibits no discharge  Left eye exhibits no discharge  Neck: Normal range of motion  Neck supple  No tracheal deviation present  Cardiovascular: Normal rate, regular rhythm and intact distal pulses  Exam reveals no gallop and no friction rub  No murmur heard  Pulses:       Dorsalis pedis pulses are 2+ on the right side, and 2+ on the left side  Posterior tibial pulses are 2+ on the right side, and 2+ on the left side  Pulmonary/Chest: Effort normal and breath sounds normal  No respiratory distress  She has no wheezes  She has no rales  Abdominal: Soft  Bowel sounds are normal  She exhibits no distension  There is no tenderness  There is no rebound and no guarding  Musculoskeletal: Normal range of motion  She exhibits no edema  Lymphadenopathy:        Head (right side): No submental and no submandibular adenopathy present  Head (left side): No submental and no submandibular adenopathy present  She has no cervical adenopathy  Right cervical: No superficial cervical, no deep cervical and no posterior cervical adenopathy present  Left cervical: No superficial cervical, no deep cervical and no posterior cervical adenopathy present  Neurological: She is alert and oriented to person, place, and time  No cranial nerve deficit or sensory deficit  Skin: Skin is warm, dry and intact  Psychiatric: Her speech is normal and behavior is normal  Judgment normal  Her mood appears not anxious  Cognition and memory are normal  She does not exhibit a depressed mood  Vitals reviewed        Sebastien Alvarado DO  [facility]

## 2020-02-20 NOTE — PATIENT INSTRUCTIONS

## 2020-02-25 ENCOUNTER — PROCEDURE VISIT (OUTPATIENT)
Dept: FAMILY MEDICINE CLINIC | Facility: CLINIC | Age: 45
End: 2020-02-25
Payer: COMMERCIAL

## 2020-02-25 VITALS
SYSTOLIC BLOOD PRESSURE: 124 MMHG | HEART RATE: 77 BPM | DIASTOLIC BLOOD PRESSURE: 82 MMHG | RESPIRATION RATE: 18 BRPM | OXYGEN SATURATION: 97 % | TEMPERATURE: 99.4 F

## 2020-02-25 DIAGNOSIS — L91.0 KELOID SCAR: Primary | ICD-10-CM

## 2020-02-25 PROCEDURE — 99213 OFFICE O/P EST LOW 20 MIN: CPT | Performed by: FAMILY MEDICINE

## 2020-02-25 PROCEDURE — 1036F TOBACCO NON-USER: CPT | Performed by: FAMILY MEDICINE

## 2020-02-25 PROCEDURE — 11402 EXC TR-EXT B9+MARG 1.1-2 CM: CPT | Performed by: FAMILY MEDICINE

## 2020-02-25 PROCEDURE — 88305 TISSUE EXAM BY PATHOLOGIST: CPT | Performed by: PATHOLOGY

## 2020-02-25 NOTE — PROGRESS NOTES
Assessment/Plan:   1  Keloid scar  Patient pathology was positive for a keloidal scar  This is a benign finding  He tolerated the excision very well  Sutures were placed  She was instructed on the proper importance of proper wound care follow up with patient in 7-10 days for suture removal   Follow up if any symptoms should worsen  - Tissue Exam; Future  - Tissue Exam           Diagnoses and all orders for this visit:    Skin lesion of back          Subjective:       Chief Complaint   Patient presents with    Skin Lesion      Patient ID: Angi Gordon is a 38 y o  female  Patient is a 25-year-old female presents today with CC of right upper back skin lesion  She states that she has had this skin lesion for past few years  She states that it recently has been becoming more irritated  Her bar her bra does rub against this area  Has been causing itching as well as burning  She has used topical steroids over this area however they have not had any effect  Review of Systems   Constitutional: Negative for activity change, chills, fatigue and fever  HENT: Negative for congestion, ear pain, sinus pressure and sore throat  Eyes: Negative for redness, itching and visual disturbance  Respiratory: Negative for cough and shortness of breath  Cardiovascular: Negative for chest pain and palpitations  Gastrointestinal: Negative for abdominal pain, diarrhea and nausea  Endocrine: Negative for cold intolerance and heat intolerance  Genitourinary: Negative for dysuria, flank pain and frequency  Musculoskeletal: Negative for arthralgias, back pain, gait problem and myalgias  Skin: Negative for color change  Allergic/Immunologic: Negative for environmental allergies  Neurological: Negative for dizziness, numbness and headaches  Psychiatric/Behavioral: Negative for behavioral problems and sleep disturbance         The following portions of the patient's history were reviewed and updated as appropriate : past family history, past medical history, past social history and past surgical history  Current Outpatient Medications:     ALPRAZolam (XANAX) 0 5 mg tablet, Take 1 tablet (0 5 mg total) by mouth 2 (two) times a day as needed for anxiety, Disp: 180 tablet, Rfl: 0    aspirin 81 MG tablet, Take 1 tablet by mouth daily, Disp: , Rfl:     atorvastatin (LIPITOR) 10 mg tablet, atorvastatin 10 mg tablet  TAKE 1 TABLET DAILY AT BEDTIME , Disp: , Rfl:     citalopram (CeleXA) 20 mg tablet, TAKE 1 TABLET BY MOUTH EVERY DAY, Disp: 90 tablet, Rfl: 0    Efinaconazole 10 % SOLN, Apply 1 application topically daily, Disp: 8 mL, Rfl: 1    ezetimibe (ZETIA) 10 mg tablet, TAKE 1 TABLET (10 MG TOTAL) BY MOUTH DAILY FOR 96 DAYS, Disp: 30 tablet, Rfl: 0    fluticasone (FLONASE) 50 mcg/act nasal spray, , Disp: , Rfl:     levothyroxine 100 mcg tablet, Take 1 tablet (100 mcg total) by mouth daily, Disp: 90 tablet, Rfl: 1    omega-3-acid ethyl esters (LOVAZA) 1 g capsule, Take 2 capsules (2 g total) by mouth 2 (two) times a day, Disp: 360 capsule, Rfl: 1    ondansetron (ZOFRAN-ODT) 8 mg disintegrating tablet, Take 1 tablet (8 mg total) by mouth every 8 (eight) hours as needed for nausea or vomiting, Disp: 30 tablet, Rfl: 0    pantoprazole (PROTONIX) 40 mg tablet, pantoprazole 40 mg tablet,delayed release, Disp: , Rfl:     traMADol (ULTRAM) 50 mg tablet, tramadol 50 mg tablet, Disp: , Rfl:          Objective:         Vitals:    02/25/20 1229   BP: 124/82   BP Location: Left arm   Patient Position: Sitting   Cuff Size: Adult   Pulse: 77   Resp: 18   Temp: 99 4 °F (37 4 °C)   TempSrc: Tympanic   SpO2: 97%       Physical Exam   Constitutional: Vital signs are normal  She appears well-developed and well-nourished  She is cooperative  She does not appear ill  No distress  HENT:   Head: Normocephalic and atraumatic     Right Ear: Hearing and external ear normal    Left Ear: Hearing and external ear normal    Nose: Nose normal  No nasal deformity or septal deviation  Mouth/Throat: Oropharynx is clear and moist and mucous membranes are normal    Eyes: Pupils are equal, round, and reactive to light  EOM and lids are normal    Neck: Trachea normal and normal range of motion  Neck supple  No edema and no erythema present  No thyromegaly present  Cardiovascular: Normal rate  Pulmonary/Chest: Effort normal  No respiratory distress  Abdominal: Normal appearance  There is no guarding  Musculoskeletal: Normal range of motion  Right shoulder: She exhibits no pain  Neurological: She is alert  She has normal strength  No cranial nerve deficit or sensory deficit  Skin: Skin is warm and dry  Psychiatric: She has a normal mood and affect  Her speech is normal and behavior is normal  Judgment and thought content normal  Cognition and memory are normal    Vitals reviewed  Skin excision  Date/Time: 2/25/2020 1:15 PM  Performed by: Mariana Larson DO  Authorized by: Dino Ken DO     Procedure Details - Skin Excision:     Number of Lesions:  1  Lesion 1:     Body area:  Trunk    Trunk location:  Back    Initial size (mm):  15       Final defect size (mm):  15    Malignancy: benign lesion      Destruction method comment:  Elliptical incision was used with a Scalpel size 11  Repair type:  Linear closure    Closure complexity: simple      Repair size (cm):  2     Five interrupted sutures placed  Patient tolerated procedure very well  Area was covered with bandage as well as Steri-Strips  Follow-up in 7-10 days for suture removal   Five simple sutures placed with 4-0 Ethilon

## 2020-03-06 ENCOUNTER — OFFICE VISIT (OUTPATIENT)
Dept: FAMILY MEDICINE CLINIC | Facility: CLINIC | Age: 45
End: 2020-03-06
Payer: COMMERCIAL

## 2020-03-06 VITALS
OXYGEN SATURATION: 96 % | TEMPERATURE: 97.3 F | DIASTOLIC BLOOD PRESSURE: 76 MMHG | HEART RATE: 81 BPM | SYSTOLIC BLOOD PRESSURE: 122 MMHG

## 2020-03-06 DIAGNOSIS — L91.0 KELOID SCAR OF SKIN: Primary | ICD-10-CM

## 2020-03-06 PROCEDURE — 1036F TOBACCO NON-USER: CPT | Performed by: FAMILY MEDICINE

## 2020-03-06 PROCEDURE — 99214 OFFICE O/P EST MOD 30 MIN: CPT | Performed by: FAMILY MEDICINE

## 2020-03-06 NOTE — PROGRESS NOTES
Assessment/Plan:   1  Keloid scar of skin  Patient's wound appears healing well  Suture tolerated very well  She was started on continuous wound care  Reviewed pathology with patient  Pathology was positive for a keloid scar  She was advised that this is a benign lesion  Will continue with routine monitoring  There are no diagnoses linked to this encounter  Subjective:       Chief Complaint   Patient presents with    Procedure      Patient ID: Sheba Young is a 38 y o  female  Patient is a 76-year-old female presents today for suture removal   She states that she was seen last week for excision of a skin lesion  She believes that this skin lesion was secondary to acute lower formation  Wound has been healing well  She denies any fevers chills redness  Review of Systems   Constitutional: Negative for activity change, chills, fatigue and fever  HENT: Negative for congestion, ear pain, sinus pressure and sore throat  Eyes: Negative for redness, itching and visual disturbance  Respiratory: Negative for cough and shortness of breath  Cardiovascular: Negative for chest pain and palpitations  Gastrointestinal: Negative for abdominal pain, diarrhea and nausea  Endocrine: Negative for cold intolerance and heat intolerance  Genitourinary: Negative for dysuria, flank pain and frequency  Musculoskeletal: Negative for arthralgias, back pain, gait problem and myalgias  Skin: Negative for color change  Allergic/Immunologic: Negative for environmental allergies  Neurological: Negative for dizziness, numbness and headaches  Psychiatric/Behavioral: Negative for behavioral problems and sleep disturbance  The following portions of the patient's history were reviewed and updated as appropriate : past family history, past medical history, past social history and past surgical history      Current Outpatient Medications:     ALPRAZolam (XANAX) 0 5 mg tablet, Take 1 tablet (0 5 mg total) by mouth 2 (two) times a day as needed for anxiety, Disp: 180 tablet, Rfl: 0    aspirin 81 MG tablet, Take 1 tablet by mouth daily, Disp: , Rfl:     citalopram (CeleXA) 20 mg tablet, TAKE 1 TABLET BY MOUTH EVERY DAY, Disp: 90 tablet, Rfl: 0    Efinaconazole 10 % SOLN, Apply 1 application topically daily, Disp: 8 mL, Rfl: 1    ezetimibe (ZETIA) 10 mg tablet, TAKE 1 TABLET (10 MG TOTAL) BY MOUTH DAILY FOR 96 DAYS, Disp: 30 tablet, Rfl: 0    levothyroxine 100 mcg tablet, Take 1 tablet (100 mcg total) by mouth daily, Disp: 90 tablet, Rfl: 1    atorvastatin (LIPITOR) 10 mg tablet, atorvastatin 10 mg tablet  TAKE 1 TABLET DAILY AT BEDTIME , Disp: , Rfl:     fluticasone (FLONASE) 50 mcg/act nasal spray, , Disp: , Rfl:     omega-3-acid ethyl esters (LOVAZA) 1 g capsule, Take 2 capsules (2 g total) by mouth 2 (two) times a day (Patient not taking: Reported on 3/6/2020), Disp: 360 capsule, Rfl: 1    ondansetron (ZOFRAN-ODT) 8 mg disintegrating tablet, Take 1 tablet (8 mg total) by mouth every 8 (eight) hours as needed for nausea or vomiting (Patient not taking: Reported on 3/6/2020), Disp: 30 tablet, Rfl: 0    pantoprazole (PROTONIX) 40 mg tablet, pantoprazole 40 mg tablet,delayed release, Disp: , Rfl:     traMADol (ULTRAM) 50 mg tablet, tramadol 50 mg tablet, Disp: , Rfl:          Objective:     Vitals:    03/06/20 1032   BP: 122/76   BP Location: Left arm   Patient Position: Sitting   Cuff Size: Adult   Pulse: 81   Temp: (!) 97 3 °F (36 3 °C)   TempSrc: Tympanic   SpO2: 96%       Physical Exam   Constitutional: Vital signs are normal  She appears well-developed and well-nourished  She is cooperative  She does not appear ill  No distress  HENT:   Head: Normocephalic and atraumatic  Right Ear: Hearing and external ear normal    Left Ear: Hearing and external ear normal    Nose: Nose normal  No nasal deformity or septal deviation     Mouth/Throat: Oropharynx is clear and moist and mucous membranes are normal    Eyes: Pupils are equal, round, and reactive to light  EOM and lids are normal    Neck: Trachea normal and normal range of motion  Neck supple  No edema and no erythema present  No thyromegaly present  Cardiovascular: Normal rate  Pulmonary/Chest: Effort normal  No respiratory distress  Abdominal: Normal appearance  There is no guarding  Musculoskeletal: Normal range of motion  Right shoulder: She exhibits no pain  Neurological: She is alert  She has normal strength  No cranial nerve deficit or sensory deficit  Skin: Skin is warm and dry  Psychiatric: She has a normal mood and affect  Her speech is normal and behavior is normal  Judgment and thought content normal  Cognition and memory are normal    Vitals reviewed  Suture removal  Date/Time: 3/6/2020 11:36 AM  Performed by: Deacon Salgado DO  Authorized by: Deacon Salgado DO     Patient location:  Clinic  Consent:     Consent obtained:  Verbal    Consent given by:  Patient  Universal protocol:     Patient identity confirmed:  Verbally with patient  Location:     Location:  Trunk    Trunk location:  Back  Procedure details: Tools used:  Suture removal kit    Wound appearance:  No sign(s) of infection, good wound healing and clean    Number of sutures removed:  5  Post-procedure details:     Post-removal:  Steri-Strips applied    Patient tolerance of procedure:   Tolerated well, no immediate complications

## 2020-03-12 DIAGNOSIS — E78.5 DYSLIPIDEMIA: ICD-10-CM

## 2020-03-13 RX ORDER — EZETIMIBE 10 MG/1
10 TABLET ORAL DAILY
Qty: 90 TABLET | Refills: 0 | Status: SHIPPED | OUTPATIENT
Start: 2020-03-13 | End: 2020-06-08

## 2020-04-27 DIAGNOSIS — E03.9 HYPOTHYROIDISM, UNSPECIFIED TYPE: ICD-10-CM

## 2020-04-27 RX ORDER — LEVOTHYROXINE SODIUM 0.1 MG/1
TABLET ORAL
Qty: 90 TABLET | Refills: 1 | Status: SHIPPED | OUTPATIENT
Start: 2020-04-27 | End: 2020-05-02 | Stop reason: SDUPTHER

## 2020-04-30 DIAGNOSIS — F41.9 ANXIETY: ICD-10-CM

## 2020-04-30 RX ORDER — ALPRAZOLAM 0.5 MG/1
0.5 TABLET ORAL 2 TIMES DAILY PRN
Qty: 180 TABLET | Refills: 0 | Status: SHIPPED | OUTPATIENT
Start: 2020-04-30 | End: 2020-05-02 | Stop reason: SDUPTHER

## 2020-05-02 DIAGNOSIS — E03.9 HYPOTHYROIDISM, UNSPECIFIED TYPE: ICD-10-CM

## 2020-05-02 DIAGNOSIS — F41.9 ANXIETY: ICD-10-CM

## 2020-05-02 RX ORDER — ALPRAZOLAM 0.5 MG/1
0.5 TABLET ORAL 2 TIMES DAILY PRN
Qty: 180 TABLET | Refills: 0 | Status: SHIPPED | OUTPATIENT
Start: 2020-05-02 | End: 2020-08-03 | Stop reason: SDUPTHER

## 2020-05-02 RX ORDER — LEVOTHYROXINE SODIUM 0.1 MG/1
100 TABLET ORAL DAILY
Qty: 90 TABLET | Refills: 1 | Status: SHIPPED | OUTPATIENT
Start: 2020-05-02 | End: 2020-08-03 | Stop reason: SDUPTHER

## 2020-05-04 ENCOUNTER — TELEPHONE (OUTPATIENT)
Dept: FAMILY MEDICINE CLINIC | Facility: CLINIC | Age: 45
End: 2020-05-04

## 2020-05-22 DIAGNOSIS — F41.1 GAD (GENERALIZED ANXIETY DISORDER): ICD-10-CM

## 2020-05-22 RX ORDER — CITALOPRAM 20 MG/1
TABLET ORAL
Qty: 90 TABLET | Refills: 0 | Status: SHIPPED | OUTPATIENT
Start: 2020-05-22 | End: 2020-10-29

## 2020-06-07 DIAGNOSIS — E78.5 DYSLIPIDEMIA: ICD-10-CM

## 2020-06-08 RX ORDER — EZETIMIBE 10 MG/1
TABLET ORAL
Qty: 90 TABLET | Refills: 0 | Status: SHIPPED | OUTPATIENT
Start: 2020-06-08 | End: 2020-07-15 | Stop reason: SDUPTHER

## 2020-06-22 ENCOUNTER — TELEPHONE (OUTPATIENT)
Dept: FAMILY MEDICINE CLINIC | Facility: CLINIC | Age: 45
End: 2020-06-22

## 2020-06-22 NOTE — TELEPHONE ENCOUNTER
The [employer] are adopting a baby they have been fostering  She will be dropping off forms that just have to be signed  They are all filled out  Carolyn Session had a TB test in March of 2019  Does she have to have another one?

## 2020-06-23 NOTE — TELEPHONE ENCOUNTER
It depends on the organization she is going through  Generally speaking a PPD test is up-to-date for a 2 year duration  However this all depends on the adoption organization if they would like her to have this PPD completed every year    Thank you

## 2020-07-01 ENCOUNTER — TELEPHONE (OUTPATIENT)
Dept: FAMILY MEDICINE CLINIC | Facility: CLINIC | Age: 45
End: 2020-07-01

## 2020-07-09 ENCOUNTER — TELEPHONE (OUTPATIENT)
Dept: FAMILY MEDICINE CLINIC | Facility: CLINIC | Age: 45
End: 2020-07-09

## 2020-07-09 NOTE — TELEPHONE ENCOUNTER
Pt called stating that she had a sleep study done years ago possibly in 2016  Pt states she tried different machines and didn't like them but her  states her snoring is getting worse and would like to try again  Pt states sleep study was ordered by you

## 2020-07-10 NOTE — TELEPHONE ENCOUNTER
For approval by her insurance, she may need to be evaluated again for this  Please schedule her    Thank you

## 2020-07-14 ENCOUNTER — TELEMEDICINE (OUTPATIENT)
Dept: FAMILY MEDICINE CLINIC | Facility: CLINIC | Age: 45
End: 2020-07-14
Payer: COMMERCIAL

## 2020-07-14 DIAGNOSIS — M54.2 CERVICALGIA: ICD-10-CM

## 2020-07-14 DIAGNOSIS — M54.6 CHRONIC RIGHT-SIDED THORACIC BACK PAIN: ICD-10-CM

## 2020-07-14 DIAGNOSIS — G89.29 CHRONIC RIGHT-SIDED THORACIC BACK PAIN: ICD-10-CM

## 2020-07-14 DIAGNOSIS — G47.33 OBSTRUCTIVE SLEEP APNEA: Primary | ICD-10-CM

## 2020-07-14 PROCEDURE — 99214 OFFICE O/P EST MOD 30 MIN: CPT | Performed by: FAMILY MEDICINE

## 2020-07-14 PROCEDURE — 1036F TOBACCO NON-USER: CPT | Performed by: FAMILY MEDICINE

## 2020-07-14 RX ORDER — GABAPENTIN 300 MG/1
300 CAPSULE ORAL
Qty: 30 CAPSULE | Refills: 3 | Status: SHIPPED | OUTPATIENT
Start: 2020-07-14 | End: 2020-08-03 | Stop reason: SDUPTHER

## 2020-07-14 NOTE — PROGRESS NOTES
Virtual Regular Visit      Assessment/Plan:  1  Obstructive sleep apnea  Reviewed patient's symptoms today  At this time, symptoms appear secondary to obstructive sleep apnea  She stated today that she wanted to pursue this treatment again and given another shot for relief  Will recheck a home sleep study  Will refer patient to Sleep Medicine as well after she completes study to further evaluate her treatment options  - Home Study; Future    2  Cervicalgia/Chronic right-sided thoracic back pain  Reviewed patient's symptoms  At this time, she has been having persistent problems with cervical neck and thoracic back pain  At this time, her pain is directly between her shoulder blades  Will check x-ray of her thoracic spine  Symptoms may likely be musculoskeletal   Will refer patient to physical therapy very will check x-ray of her thoracic spine  She may restart her gabapentin at 300 mg q h s   If any symptoms are worsening, she was advised to follow up  - XR spine thoracic 3 vw; Future  - Ambulatory referral to Physical Therapy; Future  - gabapentin (NEURONTIN) 300 mg capsule; Take 1 capsule (300 mg total) by mouth daily at bedtime  Dispense: 30 capsule; Refill: 3      Level of service:  42097    I have spent 15 minutes with Patient  today in which greater than 50% of this time was spent in counseling/coordination of care regarding Diagnostic results, Prognosis, Risks and benefits of tx options, Intructions for management, Patient and family education, Importance of tx compliance and Risk factor reductions          Problem List Items Addressed This Visit     None               Reason for visit is   Chief Complaint   Patient presents with    Virtual Regular Visit        Encounter provider Jaquelin North DO    Provider located at [address] [place] [address] [address]  [address] [facility] [address]  [facility]  [phone]      Recent Visits  Date Type Provider Dept   07/09/20 [facility]phone [facility]   Showing recent visits within past 7 days and meeting all other requirements     Future Appointments  No visits were found meeting these conditions  Showing future appointments within next 150 days and meeting all other requirements        The patient was identified by name and date of birth  Aide Dural was informed that this is a telemedicine visit and that the visit is being conducted through [facility] and patient was informed that this is a secure, HIPAA-compliant platform  She agrees to proceed     My office door was closed  No one else was in the room  She acknowledged consent and understanding of privacy and security of the video platform  The patient has agreed to participate and understands they can discontinue the visit at any time  Patient is aware this is a billable service  Ronni Gleason is a 38 y o  female presents today with a CC persistent problems with her sleep apnea  She states that she did have a sleep study 4 years ago however was intolerant a CPAP  She still has been having persistent snoring  She has daytime somnolence as well as headaches in the morning  She states that her  has noticed frequently that she does have apneic events  She would like to consider having a repeat CPAP study  Patient also has persistent problems with her cervical and thoracic pain  She states that she had an x-ray in February  She has been having persistent problems in this area  She did try taking gabapentin which she had from the past and this was effective for her              No past medical history on file  No past surgical history on file      Current Outpatient Medications   Medication Sig Dispense Refill    ALPRAZolam (XANAX) 0 5 mg tablet Take 1 tablet (0 5 mg total) by mouth 2 (two) times a day as needed for anxiety 180 tablet 0    aspirin 81 MG tablet Take 1 tablet by mouth daily      atorvastatin (LIPITOR) 10 mg tablet atorvastatin 10 mg tablet   TAKE 1 TABLET DAILY AT BEDTIME   citalopram (CeleXA) 20 mg tablet TAKE 1 TABLET BY MOUTH EVERY DAY 90 tablet 0    Efinaconazole 10 % SOLN Apply 1 application topically daily 8 mL 1    ezetimibe (ZETIA) 10 mg tablet TAKE 1 TABLET BY MOUTH EVERY DAY 90 tablet 0    fluticasone (FLONASE) 50 mcg/act nasal spray       levothyroxine 100 mcg tablet Take 1 tablet (100 mcg total) by mouth daily 90 tablet 1    omega-3-acid ethyl esters (LOVAZA) 1 g capsule Take 2 capsules (2 g total) by mouth 2 (two) times a day (Patient not taking: Reported on 3/6/2020) 360 capsule 1    ondansetron (ZOFRAN-ODT) 8 mg disintegrating tablet Take 1 tablet (8 mg total) by mouth every 8 (eight) hours as needed for nausea or vomiting (Patient not taking: Reported on 3/6/2020) 30 tablet 0    pantoprazole (PROTONIX) 40 mg tablet pantoprazole 40 mg tablet,delayed release      traMADol (ULTRAM) 50 mg tablet tramadol 50 mg tablet       No current facility-administered medications for this visit  Allergies   Allergen Reactions    Clindamycin     Codeine Hives and Other (See Comments)     hives, dilaudid ok  hives, dilaudid ok    Other Other (See Comments)     morphine=hives    Penicillins Hives and Other (See Comments)    Red Dye     Morphine Hives       Review of Systems   Constitutional: Negative for activity change, chills, fatigue and fever  HENT: Negative for congestion, ear pain, sinus pressure and sore throat  Eyes: Negative for redness, itching and visual disturbance  Respiratory: Negative for cough and shortness of breath  Cardiovascular: Negative for chest pain and palpitations  Gastrointestinal: Negative for abdominal pain, diarrhea and nausea  Endocrine: Negative for cold intolerance and heat intolerance  Genitourinary: Negative for dysuria, flank pain and frequency  Musculoskeletal: Negative for arthralgias, back pain, gait problem and myalgias     Skin: Negative for color change  Allergic/Immunologic: Negative for environmental allergies  Neurological: Negative for dizziness, numbness and headaches  Psychiatric/Behavioral: Negative for behavioral problems and sleep disturbance  Video Exam    There were no vitals filed for this visit  Physical Exam   Constitutional: She appears well-developed and well-nourished  Non-toxic appearance  She does not have a sickly appearance  She does not appear ill  No distress  HENT:   Head: Normocephalic and atraumatic  Not macrocephalic and not microcephalic  Eyes: Pupils are equal, round, and reactive to light  Conjunctivae, EOM and lids are normal    Neck: Normal range of motion  Pulmonary/Chest: Effort normal and breath sounds normal  No respiratory distress  Musculoskeletal: Normal range of motion  Neurological: No cranial nerve deficit  Skin: Skin is dry  No erythema  Psychiatric: She has a normal mood and affect  Her behavior is normal  Judgment and thought content normal                 VIRTUAL VISIT Barry acknowledges that she has consented to an online visit or consultation  She understands that the online visit is based solely on information provided by her, and that, in the absence of a face-to-face physical evaluation by the physician, the diagnosis she receives is both limited and provisional in terms of accuracy and completeness  This is not intended to replace a full medical face-to-face evaluation by the physician  Sotero Anna understands and accepts these terms

## 2020-07-15 DIAGNOSIS — E78.5 DYSLIPIDEMIA: ICD-10-CM

## 2020-07-15 RX ORDER — EZETIMIBE 10 MG/1
10 TABLET ORAL DAILY
Qty: 90 TABLET | Refills: 1 | Status: SHIPPED | OUTPATIENT
Start: 2020-07-15 | End: 2020-08-03 | Stop reason: SDUPTHER

## 2020-07-22 ENCOUNTER — HOSPITAL ENCOUNTER (OUTPATIENT)
Dept: SLEEP CENTER | Facility: CLINIC | Age: 45
Discharge: HOME | End: 2020-07-22
Payer: COMMERCIAL

## 2020-07-22 ENCOUNTER — TELEPHONE (OUTPATIENT)
Dept: SLEEP CENTER | Facility: CLINIC | Age: 45
End: 2020-07-22

## 2020-07-22 DIAGNOSIS — G47.33 OBSTRUCTIVE SLEEP APNEA: ICD-10-CM

## 2020-07-22 PROCEDURE — G0399 HOME SLEEP TEST/TYPE 3 PORTA: HCPCS

## 2020-07-22 NOTE — TELEPHONE ENCOUNTER
----- Message from Caryle Cozier, MD sent at 7/17/2020  1:18 PM EDT -----  Approved  ----- Message -----  From: Bessy Quinonez MA  Sent: 7/16/2020   2:03 PM EDT  To: Sleep Medicine [place] Provider    This sleep study needs approval      If approved please sign and return to clerical pool  If denied please include reasons why  Also provide alternative testing if warranted  Please sign and return to clerical pool

## 2020-07-23 NOTE — PROGRESS NOTES
Home Sleep Study Documentation    Pre-Sleep Home Study:    Set-up and instructions performed by: Lani Gong     Technician performed demonstration for Patient: yes    Return demonstration performed by Patient: yes    Written instructions provided to Patient: yes    Patient signed consent form: yes        Post-Sleep Home Study:    Additional comments by Patient: None    Home Sleep Study Failed:no:    Failure reason: N/A    Reported or Detected: N/A    Scored by: MICHA Olmedo, SULYGT

## 2020-07-24 ENCOUNTER — TELEPHONE (OUTPATIENT)
Dept: FAMILY MEDICINE CLINIC | Facility: CLINIC | Age: 45
End: 2020-07-24

## 2020-07-24 NOTE — TELEPHONE ENCOUNTER
Sleep study results relayed to pt  Stated she was doing study to get new CPAP machine  Wanted to see if we could just place order instead of f/u w/ sleep med  She stated previous sleep study ordered by ENT  I looked throughout chart didn't see anything  I did however see ENT in past for [address] Throat  I called and lmom asking for them to take another look to see about previous sleep study records please advise

## 2020-07-24 NOTE — TELEPHONE ENCOUNTER
Pt called back stating she spoke to ENT  States to contact [facility] in regards to paper chart to f/u on sleep study  I faxed a records request for study fax confirmation file #[id number]  Faxed to [phone] [facility]

## 2020-07-27 NOTE — TELEPHONE ENCOUNTER
Notified pt she said she had previously had at home study for titration wanted to see if that is a possibility vs staying overnight at sleep med please advise

## 2020-07-27 NOTE — TELEPHONE ENCOUNTER
I spoke to [employer]  They have new computer system since joining [facility]  It may be a couple days to get the titration info  I don't see anything in epic that she had it done at [facility] or [facility]  Her last sleep study was done with sandra in October of 2014 which is scanned in allscripts  It appears they referred her to sleep management also

## 2020-07-27 NOTE — TELEPHONE ENCOUNTER
Can you please look into this? I currently do not know what setting she would be at  It appears that this is wy she neds titration study

## 2020-07-27 NOTE — TELEPHONE ENCOUNTER
[address] ENT LM FOR ME   SHE SAID ELEANOR AT THE TIME WOULD HAVE DONE THE TITRATION STUDY BACK IN 2014  I CALLED THEM AND HER 2 STUDIES WERE INCOMPLETE  I WAS TOLD BY THEM THAT THERE IS NO REASON TO DO TITRATION STUDIES ANYMORE BECAUSE THE NEW C-PAP MACHINES SELF ADJUST  HE SAID IT IS AN AUTO TRATE C-PAP MACHINE  IF YOU NEED THE SETTINGS IT STILL SHOULD BE ON HER MACHINE AND SHE CAN READ OFF HER MACHINE    WHAT DO YOU WANT TO DO?

## 2020-07-29 ENCOUNTER — TELEPHONE (OUTPATIENT)
Dept: SLEEP CENTER | Facility: CLINIC | Age: 45
End: 2020-07-29

## 2020-07-29 NOTE — TELEPHONE ENCOUNTER
I SPOKE TO PT   ELEANOR SET HER UP FOR THE C PAP MACHINE WHICH SHE NEVER ENDED UP USING SO SHE SENT IT BACK TO THEM, SO THERE ARE NOT SETTINGS SHE HAS ACCESS TO ON THE MACHINE  I CALLED [facility] AND ALL THEY NEED IS THE HOME STUDY REPORT, DEMOS AND A SCRIPT FAXED TO THEM  SHE DID CONFIRM THAT THE CPAP MACHINES DO SELF TITRATE SO SHE DOES NOT NEED THAT  CAN YOU PUT AN ORDER IN FOR A CPAP MACHINE  I WILL FAX EVERYTHING  THANKS

## 2020-08-03 DIAGNOSIS — E03.9 HYPOTHYROIDISM, UNSPECIFIED TYPE: ICD-10-CM

## 2020-08-03 DIAGNOSIS — E78.5 DYSLIPIDEMIA: ICD-10-CM

## 2020-08-03 DIAGNOSIS — M54.2 CERVICALGIA: ICD-10-CM

## 2020-08-03 DIAGNOSIS — M54.6 CHRONIC RIGHT-SIDED THORACIC BACK PAIN: ICD-10-CM

## 2020-08-03 DIAGNOSIS — G47.33 OBSTRUCTIVE SLEEP APNEA: Primary | ICD-10-CM

## 2020-08-03 DIAGNOSIS — G89.29 CHRONIC RIGHT-SIDED THORACIC BACK PAIN: ICD-10-CM

## 2020-08-03 DIAGNOSIS — F41.9 ANXIETY: ICD-10-CM

## 2020-08-03 RX ORDER — LEVOTHYROXINE SODIUM 0.1 MG/1
100 TABLET ORAL DAILY
Qty: 90 TABLET | Refills: 1 | Status: SHIPPED | OUTPATIENT
Start: 2020-08-03 | End: 2021-01-31

## 2020-08-03 RX ORDER — ALPRAZOLAM 0.5 MG/1
0.5 TABLET ORAL 2 TIMES DAILY PRN
Qty: 20 TABLET | Refills: 0 | Status: SHIPPED | OUTPATIENT
Start: 2020-08-03 | End: 2020-08-10 | Stop reason: SDUPTHER

## 2020-08-03 RX ORDER — GABAPENTIN 300 MG/1
300 CAPSULE ORAL
Qty: 90 CAPSULE | Refills: 1 | Status: SHIPPED | OUTPATIENT
Start: 2020-08-03 | End: 2020-09-18 | Stop reason: SDUPTHER

## 2020-08-03 RX ORDER — EZETIMIBE 10 MG/1
10 TABLET ORAL DAILY
Qty: 90 TABLET | Refills: 1 | Status: SHIPPED | OUTPATIENT
Start: 2020-08-03 | End: 2021-05-11 | Stop reason: SDUPTHER

## 2020-08-03 NOTE — TELEPHONE ENCOUNTER
Call patient  I refilled her Zetia,  But only gave her 20 tablets of Xanax    She will need to follow-up with Dr Anabel Currie regarding ongoing use of this medication ( it has not been addressed in over a year)

## 2020-08-10 DIAGNOSIS — F41.9 ANXIETY: ICD-10-CM

## 2020-08-11 RX ORDER — ALPRAZOLAM 0.5 MG/1
0.5 TABLET ORAL 2 TIMES DAILY PRN
Qty: 180 TABLET | Refills: 0 | Status: SHIPPED | OUTPATIENT
Start: 2020-08-11 | End: 2020-10-29

## 2020-09-18 DIAGNOSIS — G89.29 CHRONIC RIGHT-SIDED THORACIC BACK PAIN: ICD-10-CM

## 2020-09-18 DIAGNOSIS — M54.2 CERVICALGIA: ICD-10-CM

## 2020-09-18 DIAGNOSIS — M54.6 CHRONIC RIGHT-SIDED THORACIC BACK PAIN: ICD-10-CM

## 2020-09-18 RX ORDER — GABAPENTIN 300 MG/1
300 CAPSULE ORAL
Qty: 90 CAPSULE | Refills: 1 | Status: SHIPPED | OUTPATIENT
Start: 2020-09-18 | End: 2021-03-15

## 2020-10-28 DIAGNOSIS — F41.9 ANXIETY: ICD-10-CM

## 2020-10-28 DIAGNOSIS — F41.1 GAD (GENERALIZED ANXIETY DISORDER): ICD-10-CM

## 2020-10-29 RX ORDER — ALPRAZOLAM 0.5 MG/1
TABLET ORAL
Qty: 180 TABLET | Refills: 0 | Status: SHIPPED | OUTPATIENT
Start: 2020-10-29 | End: 2021-02-10 | Stop reason: SDUPTHER

## 2020-10-29 RX ORDER — CITALOPRAM 20 MG/1
TABLET ORAL
Qty: 90 TABLET | Refills: 0 | Status: SHIPPED | OUTPATIENT
Start: 2020-10-29 | End: 2021-01-31

## 2020-11-05 ENCOUNTER — APPOINTMENT (OUTPATIENT)
Dept: RADIOLOGY | Facility: CLINIC | Age: 45
End: 2020-11-05
Payer: COMMERCIAL

## 2020-11-05 ENCOUNTER — OFFICE VISIT (OUTPATIENT)
Dept: FAMILY MEDICINE CLINIC | Facility: CLINIC | Age: 45
End: 2020-11-05
Payer: COMMERCIAL

## 2020-11-05 VITALS
TEMPERATURE: 99.1 F | OXYGEN SATURATION: 97 % | DIASTOLIC BLOOD PRESSURE: 70 MMHG | SYSTOLIC BLOOD PRESSURE: 118 MMHG | RESPIRATION RATE: 17 BRPM | HEART RATE: 75 BPM

## 2020-11-05 DIAGNOSIS — R73.03 PREDIABETES: ICD-10-CM

## 2020-11-05 DIAGNOSIS — M54.2 CERVICALGIA: ICD-10-CM

## 2020-11-05 DIAGNOSIS — M25.571 ACUTE RIGHT ANKLE PAIN: Primary | ICD-10-CM

## 2020-11-05 DIAGNOSIS — G47.33 OBSTRUCTIVE SLEEP APNEA: ICD-10-CM

## 2020-11-05 DIAGNOSIS — Z13.0 SCREENING FOR IRON DEFICIENCY ANEMIA: ICD-10-CM

## 2020-11-05 DIAGNOSIS — M25.571 ACUTE RIGHT ANKLE PAIN: ICD-10-CM

## 2020-11-05 DIAGNOSIS — E78.5 DYSLIPIDEMIA: ICD-10-CM

## 2020-11-05 DIAGNOSIS — E03.9 HYPOTHYROIDISM, UNSPECIFIED TYPE: ICD-10-CM

## 2020-11-05 DIAGNOSIS — F41.1 GAD (GENERALIZED ANXIETY DISORDER): ICD-10-CM

## 2020-11-05 PROCEDURE — 99214 OFFICE O/P EST MOD 30 MIN: CPT | Performed by: FAMILY MEDICINE

## 2020-11-05 PROCEDURE — 1036F TOBACCO NON-USER: CPT | Performed by: FAMILY MEDICINE

## 2020-11-05 PROCEDURE — 73610 X-RAY EXAM OF ANKLE: CPT

## 2020-11-05 RX ORDER — OXYCODONE HYDROCHLORIDE AND ACETAMINOPHEN 5; 325 MG/1; MG/1
1 TABLET ORAL EVERY 8 HOURS PRN
Qty: 21 TABLET | Refills: 0 | Status: SHIPPED | OUTPATIENT
Start: 2020-11-05 | End: 2020-11-12

## 2020-11-05 RX ORDER — MELOXICAM 15 MG/1
15 TABLET ORAL DAILY
Qty: 30 TABLET | Refills: 0 | Status: SHIPPED | OUTPATIENT
Start: 2020-11-05 | End: 2022-02-22

## 2020-11-07 ENCOUNTER — TELEPHONE (OUTPATIENT)
Dept: FAMILY MEDICINE CLINIC | Facility: CLINIC | Age: 45
End: 2020-11-07

## 2020-11-12 DIAGNOSIS — F11.90 OPIOID USE: Primary | ICD-10-CM

## 2020-11-12 DIAGNOSIS — M25.571 ACUTE RIGHT ANKLE PAIN: ICD-10-CM

## 2020-11-12 RX ORDER — NALOXONE HYDROCHLORIDE 4 MG/.1ML
SPRAY NASAL
Qty: 1 EACH | Refills: 1 | Status: SHIPPED | OUTPATIENT
Start: 2020-11-12 | End: 2022-01-05

## 2020-11-12 RX ORDER — OXYCODONE AND ACETAMINOPHEN 7.5; 325 MG/1; MG/1
1 TABLET ORAL EVERY 8 HOURS PRN
Qty: 20 TABLET | Refills: 0 | Status: SHIPPED | OUTPATIENT
Start: 2020-11-12 | End: 2021-12-03

## 2020-11-13 ENCOUNTER — TELEPHONE (OUTPATIENT)
Dept: FAMILY MEDICINE CLINIC | Facility: CLINIC | Age: 45
End: 2020-11-13

## 2020-11-13 DIAGNOSIS — M25.571 ACUTE RIGHT ANKLE PAIN: Primary | ICD-10-CM

## 2021-01-31 DIAGNOSIS — F41.1 GAD (GENERALIZED ANXIETY DISORDER): ICD-10-CM

## 2021-01-31 DIAGNOSIS — E03.9 HYPOTHYROIDISM, UNSPECIFIED TYPE: ICD-10-CM

## 2021-01-31 RX ORDER — LEVOTHYROXINE SODIUM 0.1 MG/1
TABLET ORAL
Qty: 90 TABLET | Refills: 1 | Status: SHIPPED | OUTPATIENT
Start: 2021-01-31 | End: 2021-08-06

## 2021-01-31 RX ORDER — CITALOPRAM 20 MG/1
TABLET ORAL
Qty: 90 TABLET | Refills: 0 | Status: SHIPPED | OUTPATIENT
Start: 2021-01-31 | End: 2021-05-09

## 2021-02-10 DIAGNOSIS — F41.9 ANXIETY: ICD-10-CM

## 2021-02-10 RX ORDER — ALPRAZOLAM 0.5 MG/1
0.5 TABLET ORAL 2 TIMES DAILY PRN
Qty: 180 TABLET | Refills: 0 | Status: SHIPPED | OUTPATIENT
Start: 2021-02-10 | End: 2021-05-11 | Stop reason: SDUPTHER

## 2021-02-10 RX ORDER — ALPRAZOLAM 0.5 MG/1
TABLET ORAL
Qty: 180 TABLET | OUTPATIENT
Start: 2021-02-10

## 2021-03-15 DIAGNOSIS — M54.2 CERVICALGIA: ICD-10-CM

## 2021-03-15 DIAGNOSIS — M54.6 CHRONIC RIGHT-SIDED THORACIC BACK PAIN: ICD-10-CM

## 2021-03-15 DIAGNOSIS — G89.29 CHRONIC RIGHT-SIDED THORACIC BACK PAIN: ICD-10-CM

## 2021-03-15 RX ORDER — GABAPENTIN 300 MG/1
CAPSULE ORAL
Qty: 90 CAPSULE | Refills: 0 | Status: SHIPPED | OUTPATIENT
Start: 2021-03-15 | End: 2021-06-21

## 2021-05-10 DIAGNOSIS — E78.5 DYSLIPIDEMIA: ICD-10-CM

## 2021-05-10 DIAGNOSIS — F41.9 ANXIETY: ICD-10-CM

## 2021-05-10 NOTE — TELEPHONE ENCOUNTER
Pt called for refills on RX'x  Xanax  [facility]  [phone]    Pt scheduled an appt for next week  She is doing fine, no changes  She is requesting that we change that to a virtual appt if you are ok with that  She has 5 kids and one is an infant  The MRI you ordered in December was denied and insurance wont cover it unless she does Physical Therapy first and she wants your opinion on that  She doesn't feel it will beneficial and doesn't have the time but will try it if you think she should       Pts Number [phone]

## 2021-05-11 RX ORDER — ALPRAZOLAM 0.5 MG/1
0.5 TABLET ORAL 2 TIMES DAILY PRN
Qty: 180 TABLET | Refills: 0 | Status: SHIPPED | OUTPATIENT
Start: 2021-05-11 | End: 2021-08-07

## 2021-05-11 RX ORDER — EZETIMIBE 10 MG/1
10 TABLET ORAL DAILY
Qty: 90 TABLET | Refills: 0 | Status: SHIPPED | OUTPATIENT
Start: 2021-05-11 | End: 2021-08-06

## 2021-05-11 NOTE — TELEPHONE ENCOUNTER
Ok For virtual appointment  If her insurance denied  The MRI, will have her go through physical therapy  I will order this at her next visit    Thank you

## 2021-05-20 ENCOUNTER — TELEMEDICINE (OUTPATIENT)
Dept: FAMILY MEDICINE CLINIC | Facility: CLINIC | Age: 46
End: 2021-05-20
Payer: COMMERCIAL

## 2021-05-20 DIAGNOSIS — F41.1 GAD (GENERALIZED ANXIETY DISORDER): ICD-10-CM

## 2021-05-20 DIAGNOSIS — E03.9 HYPOTHYROIDISM, UNSPECIFIED TYPE: ICD-10-CM

## 2021-05-20 DIAGNOSIS — E78.5 DYSLIPIDEMIA: ICD-10-CM

## 2021-05-20 DIAGNOSIS — G89.29 CHRONIC PAIN OF RIGHT ANKLE: Primary | ICD-10-CM

## 2021-05-20 DIAGNOSIS — M25.571 CHRONIC PAIN OF RIGHT ANKLE: Primary | ICD-10-CM

## 2021-05-20 DIAGNOSIS — R73.03 PREDIABETES: ICD-10-CM

## 2021-05-20 PROCEDURE — 99214 OFFICE O/P EST MOD 30 MIN: CPT | Performed by: FAMILY MEDICINE

## 2021-05-20 NOTE — PROGRESS NOTES
Virtual Regular Visit      Assessment/Plan:  1  Chronic pain of right ankle    Reviewed patient's symptoms today  At this time, symptoms appear to be persisting  At this time, given the long duration of her chronic ankle pain, she was advised that she may highly benefit from seeing a foot and ankle specialist   Referral given today  She was advised that she may highly benefit from wearing a ankle brace especially with high-level activity  - Ambulatory referral to Orthopedic Surgery; Future    2  Dyslipidemia    Unclear as to the precise control of her dyslipidemia  At this time, recheck fasting lipid panel  Continue with a strict low-fat/ low-cholesterol diet as well as her current treatment with Zetia  - Lipid Panel with Direct LDL reflex; Future    3  Hypothyroidism, unspecified type    Patient appears clinically stable  Recheck thyroid function testing  Continue with current dose of levothyroxine   - TSH, 3rd generation with Free T4 reflex; Future    4  FRAN (generalized anxiety disorder)    Patient's symptoms appear stable  Will continue with her current treatment of Celexa as well as her alprazolam   - Comprehensive metabolic panel; Future  - TSH, 3rd generation with Free T4 reflex; Future    5  Prediabetes    Patient was advised on importance of maintaining a very strict diet and exercise plan  Recheck A1c level  Follow up with patient in 6 months   - Hemoglobin A1C; Future        I have spent 15 minutes with Patient  today in which greater than 50% of this time was spent in counseling/coordination of care regarding Prognosis, Risks and benefits of tx options, Intructions for management, Patient and family education, Importance of tx compliance and Risk factor reductions          Problem List Items Addressed This Visit        Endocrine    Hypothyroidism    Relevant Orders    TSH, 3rd generation with Free T4 reflex       Other    FRAN (generalized anxiety disorder)    Relevant Orders    Comprehensive metabolic panel    TSH, 3rd generation with Free T4 reflex    Dyslipidemia    Relevant Orders    Lipid Panel with Direct LDL reflex    Prediabetes    Relevant Orders    Hemoglobin A1C    Chronic pain of right ankle - Primary    Relevant Orders    Ambulatory referral to Orthopedic Surgery               Reason for visit is   Chief Complaint   Patient presents with    Virtual Regular Visit        Encounter provider Giselle Bains DO    Provider located at [address] [facility] [address]  [phone]      Recent Visits  No visits were found meeting these conditions  Showing recent visits within past 7 days and meeting all other requirements     Today's Visits  Date Type Provider Dept   05/20/21 Telemedicine Dino Ken DO [facility]   Showing today's visits and meeting all other requirements     Future Appointments  No visits were found meeting these conditions  Showing future appointments within next 150 days and meeting all other requirements        The patient was identified by name and date of birth  Kamla Saleh was informed that this is a telemedicine visit and that the visit is being conducted through [address] [place] [address] Now and patient was informed that this is a secure, HIPAA-compliant platform  She agrees to proceed     My office door was closed  No one else was in the room  She acknowledged consent and understanding of privacy and security of the video platform  The patient has agreed to participate and understands they can discontinue the visit at any time  Patient is aware this is a billable service  Audi Braun is a 54 y o  female   Presents today via MR for her virtual visit  She has chronic right-sided ankle pain, dyslipidemia, hypothyroidism, generalized anxiety as well as pre diabetes  She has been taking her medications regularly  She denies adverse reactions with medications    He states that she was unable to complete her MRI as this was denied by insurance  She still has periodic problems with her ankles  She denies any recent acute exacerbations  She has not had any blood work completed  Kervin Morales No past medical history on file  No past surgical history on file  Current Outpatient Medications   Medication Sig Dispense Refill    ALPRAZolam (XANAX) 0 5 mg tablet Take 1 tablet (0 5 mg total) by mouth 2 (two) times a day as needed for anxiety 180 tablet 0    aspirin 81 MG tablet Take 1 tablet by mouth daily      citalopram (CeleXA) 20 mg tablet TAKE 1 TABLET BY MOUTH EVERY DAY 90 tablet 0    Efinaconazole 10 % SOLN Apply 1 application topically daily 8 mL 1    ezetimibe (ZETIA) 10 mg tablet Take 1 tablet (10 mg total) by mouth daily 90 tablet 0    fluticasone (FLONASE) 50 mcg/act nasal spray       gabapentin (NEURONTIN) 300 mg capsule TAKE 1 CAPSULE(300 MG) BY MOUTH DAILY AT BEDTIME 90 capsule 0    levothyroxine 100 mcg tablet TAKE 1 TABLET(100 MCG) BY MOUTH DAILY 90 tablet 1    meloxicam (MOBIC) 15 mg tablet Take 1 tablet (15 mg total) by mouth daily 30 tablet 0    naloxone (NARCAN) 4 mg/0 1 mL nasal spray Administer 1 spray into a nostril  If no response after 2-3 minutes, give another dose in the other nostril using a new spray  1 each 1    oxyCODONE-acetaminophen (PERCOCET) 7 5-325 MG per tablet Take 1 tablet by mouth every 8 (eight) hours as needed for moderate painMax Daily Amount: 3 tablets 20 tablet 0     No current facility-administered medications for this visit  Allergies   Allergen Reactions    Clindamycin     Codeine Hives and Other (See Comments)     hives, dilaudid ok  hives, dilaudid ok    Other Other (See Comments)     morphine=hives    Penicillins Hives and Other (See Comments)    Red Dye - Food Allergy     Morphine Hives       Review of Systems   Constitutional: Negative for activity change, chills, fatigue and fever     HENT: Negative for congestion, ear pain, sinus pressure and sore throat  Eyes: Negative for redness, itching and visual disturbance  Respiratory: Negative for cough and shortness of breath  Cardiovascular: Negative for chest pain and palpitations  Gastrointestinal: Negative for abdominal pain, diarrhea and nausea  Endocrine: Negative for cold intolerance and heat intolerance  Genitourinary: Negative for dysuria, flank pain and frequency  Musculoskeletal: Negative for arthralgias, back pain, gait problem and myalgias  Skin: Negative for color change  Allergic/Immunologic: Negative for environmental allergies  Neurological: Negative for dizziness, numbness and headaches  Psychiatric/Behavioral: Negative for behavioral problems and sleep disturbance  Video Exam    There were no vitals filed for this visit  Physical Exam  Constitutional:       General: She is not in acute distress  Appearance: She is well-developed  She is not ill-appearing or toxic-appearing  HENT:      Head: Normocephalic and atraumatic  Not macrocephalic and not microcephalic  Eyes:      General: Lids are normal       Conjunctiva/sclera: Conjunctivae normal       Pupils: Pupils are equal, round, and reactive to light  Neck:      Musculoskeletal: Normal range of motion  Pulmonary:      Effort: Pulmonary effort is normal  No respiratory distress  Breath sounds: Normal breath sounds  Musculoskeletal: Normal range of motion  Skin:     General: Skin is dry  Findings: No erythema  Neurological:      Cranial Nerves: No cranial nerve deficit  Psychiatric:         Behavior: Behavior normal          Thought Content: Thought content normal          Judgment: Judgment normal                 VIRTUAL VISIT DISCLAIMER    Monalisa Pabon acknowledges that she has consented to an online visit or consultation   She understands that the online visit is based solely on information provided by her, and that, in the absence of a face-to-face physical evaluation by the physician, the diagnosis she receives is both limited and provisional in terms of accuracy and completeness  This is not intended to replace a full medical face-to-face evaluation by the physician  Joyce Collado understands and accepts these terms

## 2021-06-21 DIAGNOSIS — G89.29 CHRONIC RIGHT-SIDED THORACIC BACK PAIN: ICD-10-CM

## 2021-06-21 DIAGNOSIS — M54.2 CERVICALGIA: ICD-10-CM

## 2021-06-21 DIAGNOSIS — M54.6 CHRONIC RIGHT-SIDED THORACIC BACK PAIN: ICD-10-CM

## 2021-06-21 RX ORDER — GABAPENTIN 300 MG/1
CAPSULE ORAL
Qty: 90 CAPSULE | Refills: 0 | Status: SHIPPED | OUTPATIENT
Start: 2021-06-21 | End: 2021-10-18

## 2021-07-06 ENCOUNTER — TELEPHONE (OUTPATIENT)
Dept: FAMILY MEDICINE CLINIC | Facility: CLINIC | Age: 46
End: 2021-07-06

## 2021-07-08 NOTE — TELEPHONE ENCOUNTER
Spoke w/ pt using nasal pillows, machine humidified, and using disposable filters  Asked pt for pressure setting if needed states she believe pressure is at 4  I reviewed media tab there is a ov note for sleep med 10/16/20 if any pressure settings are needed for reference

## 2021-07-22 ENCOUNTER — TELEPHONE (OUTPATIENT)
Dept: FAMILY MEDICINE CLINIC | Facility: CLINIC | Age: 46
End: 2021-07-22

## 2021-08-06 DIAGNOSIS — E78.5 DYSLIPIDEMIA: ICD-10-CM

## 2021-08-06 DIAGNOSIS — F41.1 GAD (GENERALIZED ANXIETY DISORDER): ICD-10-CM

## 2021-08-06 DIAGNOSIS — F41.9 ANXIETY: ICD-10-CM

## 2021-08-06 DIAGNOSIS — E03.9 HYPOTHYROIDISM, UNSPECIFIED TYPE: ICD-10-CM

## 2021-08-06 RX ORDER — LEVOTHYROXINE SODIUM 0.1 MG/1
TABLET ORAL
Qty: 90 TABLET | Refills: 1 | Status: SHIPPED | OUTPATIENT
Start: 2021-08-06 | End: 2022-05-26 | Stop reason: SDUPTHER

## 2021-08-06 RX ORDER — CITALOPRAM 20 MG/1
TABLET ORAL
Qty: 90 TABLET | Refills: 0 | Status: SHIPPED | OUTPATIENT
Start: 2021-08-06 | End: 2021-12-03 | Stop reason: SDUPTHER

## 2021-08-06 RX ORDER — EZETIMIBE 10 MG/1
TABLET ORAL
Qty: 90 TABLET | Refills: 0 | Status: SHIPPED | OUTPATIENT
Start: 2021-08-06 | End: 2021-11-03

## 2021-08-07 RX ORDER — ALPRAZOLAM 0.5 MG/1
TABLET ORAL
Qty: 180 TABLET | Refills: 0 | Status: SHIPPED | OUTPATIENT
Start: 2021-08-07 | End: 2021-11-06

## 2021-11-26 ENCOUNTER — TELEPHONE (OUTPATIENT)
Dept: FAMILY MEDICINE CLINIC | Facility: CLINIC | Age: 46
End: 2021-11-26

## 2021-12-03 ENCOUNTER — TELEMEDICINE (OUTPATIENT)
Dept: FAMILY MEDICINE CLINIC | Facility: CLINIC | Age: 46
End: 2021-12-03
Payer: COMMERCIAL

## 2021-12-03 DIAGNOSIS — E03.9 HYPOTHYROIDISM, UNSPECIFIED TYPE: ICD-10-CM

## 2021-12-03 DIAGNOSIS — R73.03 PREDIABETES: ICD-10-CM

## 2021-12-03 DIAGNOSIS — M54.2 CERVICALGIA: ICD-10-CM

## 2021-12-03 DIAGNOSIS — F41.1 GAD (GENERALIZED ANXIETY DISORDER): ICD-10-CM

## 2021-12-03 DIAGNOSIS — F41.9 ANXIETY: Primary | ICD-10-CM

## 2021-12-03 DIAGNOSIS — M54.6 CHRONIC RIGHT-SIDED THORACIC BACK PAIN: ICD-10-CM

## 2021-12-03 DIAGNOSIS — G89.29 CHRONIC RIGHT-SIDED THORACIC BACK PAIN: ICD-10-CM

## 2021-12-03 DIAGNOSIS — E78.5 DYSLIPIDEMIA: ICD-10-CM

## 2021-12-03 DIAGNOSIS — G47.33 OBSTRUCTIVE SLEEP APNEA: ICD-10-CM

## 2021-12-03 PROCEDURE — 1036F TOBACCO NON-USER: CPT | Performed by: FAMILY MEDICINE

## 2021-12-03 PROCEDURE — 99214 OFFICE O/P EST MOD 30 MIN: CPT | Performed by: FAMILY MEDICINE

## 2021-12-03 RX ORDER — EZETIMIBE 10 MG/1
10 TABLET ORAL DAILY
Qty: 90 TABLET | Refills: 1 | Status: SHIPPED | OUTPATIENT
Start: 2021-12-03

## 2021-12-03 RX ORDER — ALPRAZOLAM 0.5 MG/1
0.5 TABLET ORAL 2 TIMES DAILY PRN
Qty: 180 TABLET | Refills: 0 | Status: SHIPPED | OUTPATIENT
Start: 2021-12-03 | End: 2022-03-03

## 2021-12-03 RX ORDER — CITALOPRAM 20 MG/1
20 TABLET ORAL DAILY
Qty: 90 TABLET | Refills: 1 | Status: SHIPPED | OUTPATIENT
Start: 2021-12-03

## 2021-12-21 ENCOUNTER — TELEPHONE (OUTPATIENT)
Dept: FAMILY MEDICINE CLINIC | Facility: CLINIC | Age: 46
End: 2021-12-21

## 2021-12-21 NOTE — TELEPHONE ENCOUNTER
Voicemail from patient stating she is on day 6 of "illness" and that her  tested positive for COVID  She has headache and cough  Tried calling patient back but voicemail full and could not leave message  Patient needs virtual visit

## 2021-12-27 ENCOUNTER — TELEPHONE (OUTPATIENT)
Dept: FAMILY MEDICINE CLINIC | Facility: CLINIC | Age: 46
End: 2021-12-27

## 2021-12-27 NOTE — TELEPHONE ENCOUNTER
Pt called and LMOM to have a vv for lingering covid symptoms  I called pt and unable to [facility] due to vm box being full  Also unable to send message through [address]

## 2021-12-28 ENCOUNTER — TELEMEDICINE (OUTPATIENT)
Dept: FAMILY MEDICINE CLINIC | Facility: CLINIC | Age: 46
End: 2021-12-28
Payer: COMMERCIAL

## 2021-12-28 DIAGNOSIS — U07.1 COVID-19: Primary | ICD-10-CM

## 2021-12-28 PROCEDURE — 99213 OFFICE O/P EST LOW 20 MIN: CPT | Performed by: FAMILY MEDICINE

## 2021-12-28 RX ORDER — PREDNISONE 10 MG/1
TABLET ORAL
Qty: 30 TABLET | Refills: 0 | Status: SHIPPED | OUTPATIENT
Start: 2021-12-28 | End: 2022-02-22

## 2021-12-28 RX ORDER — FLUTICASONE FUROATE AND VILANTEROL TRIFENATATE 100; 25 UG/1; UG/1
1 POWDER RESPIRATORY (INHALATION) DAILY
Qty: 60 BLISTER | Refills: 0 | Status: SHIPPED | OUTPATIENT
Start: 2021-12-28 | End: 2022-01-24

## 2022-01-04 ENCOUNTER — TELEPHONE (OUTPATIENT)
Dept: FAMILY MEDICINE CLINIC | Facility: CLINIC | Age: 47
End: 2022-01-04

## 2022-01-04 NOTE — TELEPHONE ENCOUNTER
Patient was in the ER and wanted to make you aware that she is being monitored by [facility] remotely and wants to make sure that someone looks over her notes

## 2022-01-05 ENCOUNTER — TELEMEDICINE (OUTPATIENT)
Dept: FAMILY MEDICINE CLINIC | Facility: CLINIC | Age: 47
End: 2022-01-05
Payer: COMMERCIAL

## 2022-01-05 DIAGNOSIS — J12.82 PNEUMONIA DUE TO COVID-19 VIRUS: Primary | ICD-10-CM

## 2022-01-05 DIAGNOSIS — R30.0 DYSURIA: ICD-10-CM

## 2022-01-05 DIAGNOSIS — U07.1 PNEUMONIA DUE TO COVID-19 VIRUS: Primary | ICD-10-CM

## 2022-01-05 PROCEDURE — 99214 OFFICE O/P EST MOD 30 MIN: CPT | Performed by: FAMILY MEDICINE

## 2022-01-05 RX ORDER — AZITHROMYCIN 250 MG/1
TABLET, FILM COATED ORAL
Qty: 6 TABLET | Refills: 0 | Status: SHIPPED | OUTPATIENT
Start: 2022-01-05 | End: 2022-01-10

## 2022-01-05 RX ORDER — ALBUTEROL SULFATE 90 UG/1
2 AEROSOL, METERED RESPIRATORY (INHALATION) EVERY 6 HOURS PRN
Qty: 6.7 G | Refills: 0 | Status: SHIPPED | OUTPATIENT
Start: 2022-01-05 | End: 2022-06-01

## 2022-01-05 RX ORDER — BUTALBITAL, ACETAMINOPHEN AND CAFFEINE 50; 325; 40 MG/1; MG/1; MG/1
1 TABLET ORAL EVERY 6 HOURS PRN
COMMUNITY
Start: 2021-12-30 | End: 2022-01-09

## 2022-01-05 NOTE — ASSESSMENT & PLAN NOTE
Patient with COVID pneumonia has improvement in symptoms  SpO2 99% today  Lingering cough and fatigue  Patient instructed to increase fluid intake and rest    Recheck blood work -CBC and CMP  Obtain chest xray to assess for resolution of pneumonia  Patient instructed to schedule visit for follow up in 2 weeks

## 2022-01-05 NOTE — PROGRESS NOTES
COVID-19 Outpatient Progress Note    Assessment/Plan:    Problem List Items Addressed This Visit        Respiratory    Pneumonia due to COVID-19 virus - Primary     Patient with COVID pneumonia has improvement in symptoms  SpO2 99% today  Lingering cough and fatigue  Patient instructed to increase fluid intake and rest    Recheck blood work -CBC and CMP  Obtain chest xray to assess for resolution of pneumonia  Patient instructed to schedule visit for follow up in 2 weeks  Relevant Medications    azithromycin (Zithromax) 250 mg tablet    albuterol (Ventolin HFA) 90 mcg/act inhaler    Other Relevant Orders    Comprehensive metabolic panel    CBC and differential    XR chest pa & lateral       Other    Dysuria     Increase water intake  Check UA and treat if indicated with antibiotic  Relevant Orders    UA (URINE) with reflex to Scope         Disposition:     I have spent 25 minutes directly with the patient  Greater than 50% of this time was spent in counseling/coordination of care regarding: prognosis, risks and benefits of treatment options, instructions for management, patient and family education, importance of treatment compliance, risk factor reductions and impressions  Encounter provider Ryan Roe DO    Provider located at [place] [address]  [address] [place] [address] [address] [place] [id number] [phone]    Recent Visits  Date Type Provider Dept   01/04/22 Telephone Maddy Frederick [place] [id number] Victory Abran recent visits within past 7 days and meeting all other requirements  Today's Visits  Date Type Provider Dept   01/05/22 Telemedicine Ryan Roe DO Pg [id number] Kenyon Osullivan today's visits and meeting all other requirements  Future Appointments  No visits were found meeting these conditions    Showing future appointments within next 150 days and meeting all other requirements     This virtual check-in was done via [employer] and patient was informed that this is a secure, HIPAA-compliant platform  She agrees to proceed  Patient agrees to participate in a virtual check in via telephone or video visit instead of presenting to the office to address urgent/immediate medical needs  Patient is aware this is a billable service  After connecting through [facility], the patient was identified by name and date of birth  Servando Garcia was informed that this was a telemedicine visit and that the exam was being conducted confidentially over secure lines  My office door was closed  No one else was in the room  Servando Garcia acknowledged consent and understanding of privacy and security of the telemedicine visit  I informed the patient that I have reviewed her record in Epic and presented the opportunity for her to ask any questions regarding the visit today  The patient agreed to participate  Verification of patient location:  Patient is located in the following state in which I hold an active license: [place]    Subjective:   Servando Garcia is a 51 y o  female who has been screened for COVID-19  Symptom change since last report: improving  Patient's symptoms include fever, chills, fatigue, nasal congestion, cough, shortness of breath, chest tightness, diarrhea, myalgias and headache  Patient denies abdominal pain  Date of symptom onset: 12/14/2021  COVID-19 vaccination status: Not vaccinated    [place] has been staying home and has isolated themselves in her home  She is taking care to not share personal items and is cleaning all surfaces that are touched often, like counters, tabletops, and doorknobs using household cleaning sprays or wipes  She is wearing a mask when she leaves her room  Patient went to the [facility] ER on 12/30 with difficulty breathing  She was found to have COVID pneumonia  She was recommended to be admitted but she signed out AMA from the ED because she has 5 children and could not get   Patient has been checked on daily by [facility]  She is getting enough fluids  She believes she may have a UTI because of dysuria  She was having diarrhea up until yesterday when she took immodium  No diarrhea today  Today the patient is feeling much better  Her SpO2 was 99% this morning  She denies difficulty breathing  Denies chest pain  No results found for: [address] [facility] 98, 185 [facility], [address] [facility] [address], [facility] 116, 350 [facility]  History reviewed  No pertinent past medical history  History reviewed  No pertinent surgical history  Current Outpatient Medications   Medication Sig Dispense Refill    butalbital-acetaminophen-caffeine (FIORICET,ESGIC) -40 mg per tablet Take 1 tablet by mouth every 6 (six) hours as needed      albuterol (Ventolin HFA) 90 mcg/act inhaler Inhale 2 puffs every 6 (six) hours as needed for wheezing 6 7 g 0    ALPRAZolam (XANAX) 0 5 mg tablet Take 1 tablet (0 5 mg total) by mouth 2 (two) times a day as needed for anxiety 180 tablet 0    aspirin 81 MG tablet Take 1 tablet by mouth daily      azithromycin (Zithromax) 250 mg tablet Take 2 tablets (500 mg total) by mouth daily for 1 day, THEN 1 tablet (250 mg total) daily for 4 days  6 tablet 0    citalopram (CeleXA) 20 mg tablet Take 1 tablet (20 mg total) by mouth daily 90 tablet 1    Efinaconazole 10 % SOLN Apply 1 application topically daily 8 mL 1    ezetimibe (ZETIA) 10 mg tablet Take 1 tablet (10 mg total) by mouth daily 90 tablet 1    fluticasone (FLONASE) 50 mcg/act nasal spray       fluticasone-vilanterol (Breo Ellipta) 100-25 mcg/inh inhaler Inhale 1 puff daily Rinse mouth after use   60 blister 0    gabapentin (NEURONTIN) 300 mg capsule TAKE 1 CAPSULE(300 MG) BY MOUTH DAILY AT BEDTIME 90 capsule 0    levothyroxine 100 mcg tablet TAKE 1 TABLET(100 MCG) BY MOUTH DAILY 90 tablet 1    meloxicam (MOBIC) 15 mg tablet Take 1 tablet (15 mg total) by mouth daily 30 tablet 0    predniSONE 10 mg tablet Five p o  for 2 days Four p o  for 2 Three p o  for 2 days Two p o  for 2 days One p o  for 2 days 30 tablet 0     No current facility-administered medications for this visit  Allergies   Allergen Reactions    Clindamycin     Codeine Hives and Other (See Comments)     hives, dilaudid ok  hives, dilaudid ok    Other Other (See Comments)     morphine=hives    Penicillins Hives and Other (See Comments)    Red Dye - Food Allergy     Morphine Hives       Review of Systems   Constitutional: Positive for chills, fatigue and fever  HENT: Positive for congestion  Respiratory: Positive for cough, chest tightness and shortness of breath  Gastrointestinal: Positive for diarrhea  Negative for abdominal pain  Musculoskeletal: Positive for myalgias  Neurological: Positive for headaches  Objective: There were no vitals filed for this visit  Physical Exam  Vitals and nursing note reviewed  Constitutional:       General: She is not in acute distress  Appearance: Normal appearance  She is not ill-appearing or toxic-appearing  HENT:      Head: Normocephalic and atraumatic  Nose: Congestion present  Pulmonary:      Effort: Pulmonary effort is normal  No respiratory distress  Neurological:      General: No focal deficit present  Mental Status: She is alert and oriented to person, place, and time  Mental status is at baseline  Psychiatric:         Mood and Affect: Mood normal          Behavior: Behavior normal          Thought Content: Thought content normal          Judgment: Judgment normal          VIRTUAL VISIT Barry verbally agrees to participate in [employer]  Pt is aware that [employer] could be limited without vital signs or the ability to perform a full hands-on physical Malinda Mcclellan understands she or the provider may request at any time to terminate the video visit and request the patient to seek care or treatment in person

## 2022-01-10 ENCOUNTER — APPOINTMENT (OUTPATIENT)
Dept: RADIOLOGY | Facility: CLINIC | Age: 47
End: 2022-01-10
Payer: COMMERCIAL

## 2022-01-10 ENCOUNTER — APPOINTMENT (OUTPATIENT)
Dept: LAB | Facility: CLINIC | Age: 47
End: 2022-01-10
Payer: COMMERCIAL

## 2022-01-10 DIAGNOSIS — U07.1 PNEUMONIA DUE TO COVID-19 VIRUS: ICD-10-CM

## 2022-01-10 DIAGNOSIS — J12.82 PNEUMONIA DUE TO COVID-19 VIRUS: ICD-10-CM

## 2022-01-10 DIAGNOSIS — E78.5 DYSLIPIDEMIA: ICD-10-CM

## 2022-01-10 DIAGNOSIS — E03.9 HYPOTHYROIDISM, UNSPECIFIED TYPE: ICD-10-CM

## 2022-01-10 DIAGNOSIS — F41.9 ANXIETY: ICD-10-CM

## 2022-01-10 DIAGNOSIS — R73.03 PREDIABETES: ICD-10-CM

## 2022-01-10 LAB
ALBUMIN SERPL BCP-MCNC: 3.3 G/DL (ref 3.5–5)
ALP SERPL-CCNC: 58 U/L (ref 46–116)
ALT SERPL W P-5'-P-CCNC: 19 U/L (ref 12–78)
ANION GAP SERPL CALCULATED.3IONS-SCNC: 6 MMOL/L (ref 4–13)
AST SERPL W P-5'-P-CCNC: 10 U/L (ref 5–45)
BASOPHILS # BLD AUTO: 0.07 THOUSANDS/ΜL (ref 0–0.1)
BASOPHILS NFR BLD AUTO: 1 % (ref 0–1)
BILIRUB SERPL-MCNC: 0.43 MG/DL (ref 0.2–1)
BILIRUB UR QL STRIP: NEGATIVE
BUN SERPL-MCNC: 9 MG/DL (ref 5–25)
CALCIUM ALBUM COR SERPL-MCNC: 9.1 MG/DL (ref 8.3–10.1)
CALCIUM SERPL-MCNC: 8.5 MG/DL (ref 8.3–10.1)
CHLORIDE SERPL-SCNC: 106 MMOL/L (ref 100–108)
CHOLEST SERPL-MCNC: 167 MG/DL
CLARITY UR: CLEAR
CO2 SERPL-SCNC: 26 MMOL/L (ref 21–32)
COLOR UR: YELLOW
CREAT SERPL-MCNC: 0.75 MG/DL (ref 0.6–1.3)
EOSINOPHIL # BLD AUTO: 0.15 THOUSAND/ΜL (ref 0–0.61)
EOSINOPHIL NFR BLD AUTO: 2 % (ref 0–6)
ERYTHROCYTE [DISTWIDTH] IN BLOOD BY AUTOMATED COUNT: 13.7 % (ref 11.6–15.1)
EST. AVERAGE GLUCOSE BLD GHB EST-MCNC: 143 MG/DL
GFR SERPL CREATININE-BSD FRML MDRD: 95 ML/MIN/1.73SQ M
GLUCOSE P FAST SERPL-MCNC: 89 MG/DL (ref 65–99)
GLUCOSE UR STRIP-MCNC: NEGATIVE MG/DL
HBA1C MFR BLD: 6.6 %
HCT VFR BLD AUTO: 32.2 % (ref 34.8–46.1)
HDLC SERPL-MCNC: 31 MG/DL
HGB BLD-MCNC: 10.2 G/DL (ref 11.5–15.4)
HGB UR QL STRIP.AUTO: NEGATIVE
IMM GRANULOCYTES # BLD AUTO: 0.08 THOUSAND/UL (ref 0–0.2)
IMM GRANULOCYTES NFR BLD AUTO: 1 % (ref 0–2)
KETONES UR STRIP-MCNC: NEGATIVE MG/DL
LDLC SERPL DIRECT ASSAY-MCNC: 65 MG/DL (ref 0–100)
LEUKOCYTE ESTERASE UR QL STRIP: NEGATIVE
LYMPHOCYTES # BLD AUTO: 1.88 THOUSANDS/ΜL (ref 0.6–4.47)
LYMPHOCYTES NFR BLD AUTO: 24 % (ref 14–44)
MCH RBC QN AUTO: 27.1 PG (ref 26.8–34.3)
MCHC RBC AUTO-ENTMCNC: 31.7 G/DL (ref 31.4–37.4)
MCV RBC AUTO: 86 FL (ref 82–98)
MONOCYTES # BLD AUTO: 0.75 THOUSAND/ΜL (ref 0.17–1.22)
MONOCYTES NFR BLD AUTO: 10 % (ref 4–12)
NEUTROPHILS # BLD AUTO: 4.95 THOUSANDS/ΜL (ref 1.85–7.62)
NEUTS SEG NFR BLD AUTO: 62 % (ref 43–75)
NITRITE UR QL STRIP: NEGATIVE
NRBC BLD AUTO-RTO: 0 /100 WBCS
PH UR STRIP.AUTO: 6 [PH]
PLATELET # BLD AUTO: 340 THOUSANDS/UL (ref 149–390)
PMV BLD AUTO: 11.4 FL (ref 8.9–12.7)
POTASSIUM SERPL-SCNC: 4 MMOL/L (ref 3.5–5.3)
PROT SERPL-MCNC: 6.8 G/DL (ref 6.4–8.2)
PROT UR STRIP-MCNC: NEGATIVE MG/DL
RBC # BLD AUTO: 3.76 MILLION/UL (ref 3.81–5.12)
SODIUM SERPL-SCNC: 138 MMOL/L (ref 136–145)
SP GR UR STRIP.AUTO: 1.02 (ref 1–1.03)
T4 FREE SERPL-MCNC: 0.94 NG/DL (ref 0.76–1.46)
TRIGL SERPL-MCNC: 638 MG/DL
TSH SERPL DL<=0.05 MIU/L-ACNC: 5.18 UIU/ML (ref 0.36–3.74)
UROBILINOGEN UR QL STRIP.AUTO: 0.2 E.U./DL
WBC # BLD AUTO: 7.88 THOUSAND/UL (ref 4.31–10.16)

## 2022-01-10 PROCEDURE — 84443 ASSAY THYROID STIM HORMONE: CPT

## 2022-01-10 PROCEDURE — 83721 ASSAY OF BLOOD LIPOPROTEIN: CPT

## 2022-01-10 PROCEDURE — 85025 COMPLETE CBC W/AUTO DIFF WBC: CPT

## 2022-01-10 PROCEDURE — 36415 COLL VENOUS BLD VENIPUNCTURE: CPT

## 2022-01-10 PROCEDURE — 84439 ASSAY OF FREE THYROXINE: CPT

## 2022-01-10 PROCEDURE — 83036 HEMOGLOBIN GLYCOSYLATED A1C: CPT

## 2022-01-10 PROCEDURE — 81003 URINALYSIS AUTO W/O SCOPE: CPT | Performed by: FAMILY MEDICINE

## 2022-01-10 PROCEDURE — 80053 COMPREHEN METABOLIC PANEL: CPT

## 2022-01-10 PROCEDURE — 80061 LIPID PANEL: CPT

## 2022-01-10 PROCEDURE — 71046 X-RAY EXAM CHEST 2 VIEWS: CPT

## 2022-01-14 ENCOUNTER — TELEMEDICINE (OUTPATIENT)
Dept: FAMILY MEDICINE CLINIC | Facility: CLINIC | Age: 47
End: 2022-01-14
Payer: COMMERCIAL

## 2022-01-14 DIAGNOSIS — R73.03 PREDIABETES: ICD-10-CM

## 2022-01-14 DIAGNOSIS — D64.9 ANEMIA, UNSPECIFIED TYPE: ICD-10-CM

## 2022-01-14 DIAGNOSIS — U07.1 PNEUMONIA DUE TO COVID-19 VIRUS: Primary | ICD-10-CM

## 2022-01-14 DIAGNOSIS — J12.82 PNEUMONIA DUE TO COVID-19 VIRUS: Primary | ICD-10-CM

## 2022-01-14 DIAGNOSIS — M54.2 CERVICALGIA: ICD-10-CM

## 2022-01-14 DIAGNOSIS — G89.29 CHRONIC RIGHT-SIDED THORACIC BACK PAIN: ICD-10-CM

## 2022-01-14 DIAGNOSIS — E78.1 HYPERTRIGLYCERIDEMIA: ICD-10-CM

## 2022-01-14 DIAGNOSIS — E03.9 HYPOTHYROIDISM, UNSPECIFIED TYPE: ICD-10-CM

## 2022-01-14 DIAGNOSIS — M54.6 CHRONIC RIGHT-SIDED THORACIC BACK PAIN: ICD-10-CM

## 2022-01-14 PROCEDURE — 99214 OFFICE O/P EST MOD 30 MIN: CPT | Performed by: FAMILY MEDICINE

## 2022-01-14 PROCEDURE — 1036F TOBACCO NON-USER: CPT | Performed by: FAMILY MEDICINE

## 2022-01-14 RX ORDER — GABAPENTIN 300 MG/1
CAPSULE ORAL
Qty: 90 CAPSULE | Refills: 0 | Status: SHIPPED | OUTPATIENT
Start: 2022-01-14 | End: 2022-04-20

## 2022-01-14 RX ORDER — FENOFIBRATE 54 MG/1
54 TABLET ORAL DAILY
Qty: 30 TABLET | Refills: 5 | Status: SHIPPED | OUTPATIENT
Start: 2022-01-14 | End: 2022-02-22 | Stop reason: SDUPTHER

## 2022-01-14 NOTE — PROGRESS NOTES
Virtual Regular Visit    Verification of patient location:    Patient is located in the following state in which I hold an active license [place]      Assessment/Plan:  1  Pneumonia due to COVID-19 virus/ anemia    Reviewed patient's previous symptoms as well as her hospital record  At this time, she appears to be asymptomatically improving  She was advised to continue with routine home monitoring  Reviewed her blood work  It appears that she did have  A mild anemia  This likely may be due to reaction from her COVID infection  Will recheck her CBC  2  Hypertriglyceridemia    Patient's blood work  Her triglycerides appeared severely elevated at 638  At this time, she has had elevated triglycerides for many years  This is the 1st severe elevation  She states that she has been trying to follow a strict diet  Continue with a strict low-fat /low-cholesterol diet  Given the severity, will start treatment with fenofibrate  Will continue with her Zetia  Recheck her lipid panel in 3 months  - fenofibrate (TRICOR) 54 MG tablet; Take 1 tablet (54 mg total) by mouth daily  Dispense: 30 tablet; Refill: 5  - Lipid Panel with Direct LDL reflex; Future    3  Prediabetes    Reviewed patient's A1c level  Her A1c was elevated at 6 6  She was recently on steroids for her COVID pneumonia  It is likely that this elevation may have been secondary to prednisone use  At this time, she must maintain a very strict low high carbohydrate diet as well as a regular exercise plan  Recheck CMP and A1c level in 3 months  - Comprehensive metabolic panel; Future  - Hemoglobin A1C; Future    4  Hypothyroidism, unspecified type    Patient's his TSH was minimally elevated  This may likely be secondary to discontinuation of her levothyroxine  At this time, she has restarted this medication  Continue with proper treatment of this problem  Recheck TSH level in 3 months    Follow-up at that time   - TSH, 3rd generation with Free T4 reflex; Future        I have spent 17 minutes with Patient  today in which greater than 50% of this time was spent in counseling/coordination of care regarding Diagnostic results, Prognosis, Risks and benefits of tx options, Intructions for management, Patient and family education, Importance of tx compliance and Risk factor reductions  Problem List Items Addressed This Visit        Endocrine    Hypothyroidism    Relevant Orders    TSH, 3rd generation with Free T4 reflex       Respiratory    Pneumonia due to COVID-19 virus - Primary       Other    Prediabetes    Relevant Orders    Comprehensive metabolic panel    Hemoglobin A1C      Other Visit Diagnoses     Hypertriglyceridemia        Relevant Medications    fenofibrate (TRICOR) 54 MG tablet    Other Relevant Orders    Lipid Panel with Direct LDL reflex    Anemia, unspecified type        Relevant Orders    CBC               Reason for visit is   Chief Complaint   Patient presents with    Virtual Regular Visit        Encounter provider Cornelia Reese DO    Provider located at [address] [facility] [address]  [phone]      Recent Visits  No visits were found meeting these conditions  Showing recent visits within past 7 days and meeting all other requirements  Today's Visits  Date Type Provider Dept   01/14/22 Telemedicine Dino Kne DO [facility]   Showing today's visits and meeting all other requirements  Future Appointments  No visits were found meeting these conditions  Showing future appointments within next 150 days and meeting all other requirements       The patient was identified by name and date of birth  Glenroy Silviano was informed that this is a telemedicine visit and that the visit is being conducted through [facility] and patient was informed this is a secure, HIPAA-complaint platform  She agrees to proceed     My office door was closed   No one else was in the room  She acknowledged consent and understanding of privacy and security of the video platform  The patient has agreed to participate and understands they can discontinue the visit at any time  Patient is aware this is a billable service  Abby Stringer is a 51 y o  female  Presents today for her virtual appointment  She is following up on her recent blood work  She was recently diagnosed with COVID pneumonia  She was admitted and had treatment which included prednisone as well as pulse oximetry monitoring  She does have dyslipidemia, prediabetes, hypothyroidism  She states that she did discontinue multiple medications while she was sick for 2 weeks  She has since restarted her medications  Matias Side HPI     No past medical history on file  No past surgical history on file  Current Outpatient Medications   Medication Sig Dispense Refill    albuterol (Ventolin HFA) 90 mcg/act inhaler Inhale 2 puffs every 6 (six) hours as needed for wheezing 6 7 g 0    ALPRAZolam (XANAX) 0 5 mg tablet Take 1 tablet (0 5 mg total) by mouth 2 (two) times a day as needed for anxiety 180 tablet 0    aspirin 81 MG tablet Take 1 tablet by mouth daily      citalopram (CeleXA) 20 mg tablet Take 1 tablet (20 mg total) by mouth daily 90 tablet 1    Efinaconazole 10 % SOLN Apply 1 application topically daily 8 mL 1    ezetimibe (ZETIA) 10 mg tablet Take 1 tablet (10 mg total) by mouth daily 90 tablet 1    fenofibrate (TRICOR) 54 MG tablet Take 1 tablet (54 mg total) by mouth daily 30 tablet 5    fluticasone (FLONASE) 50 mcg/act nasal spray       fluticasone-vilanterol (Breo Ellipta) 100-25 mcg/inh inhaler Inhale 1 puff daily Rinse mouth after use   60 blister 0    gabapentin (NEURONTIN) 300 mg capsule TAKE 1 CAPSULE(300 MG) BY MOUTH DAILY AT BEDTIME 90 capsule 0    levothyroxine 100 mcg tablet TAKE 1 TABLET(100 MCG) BY MOUTH DAILY 90 tablet 1    meloxicam (MOBIC) 15 mg tablet Take 1 tablet (15 mg total) by mouth daily 30 tablet 0    predniSONE 10 mg tablet Five p o  for 2 days Four p o  for 2 Three p o  for 2 days Two p o  for 2 days One p o  for 2 days 30 tablet 0     No current facility-administered medications for this visit  Allergies   Allergen Reactions    Clindamycin     Codeine Hives and Other (See Comments)     hives, dilaudid ok  hives, dilaudid ok    Other Other (See Comments)     morphine=hives    Penicillins Hives and Other (See Comments)    Red Dye - Food Allergy     Morphine Hives       Review of Systems   Constitutional: Negative for activity change, chills, fatigue and fever  HENT: Negative for congestion, ear pain, sinus pressure and sore throat  Eyes: Negative for redness, itching and visual disturbance  Respiratory: Negative for cough and shortness of breath  Cardiovascular: Negative for chest pain and palpitations  Gastrointestinal: Negative for abdominal pain, diarrhea and nausea  Endocrine: Negative for cold intolerance and heat intolerance  Genitourinary: Negative for dysuria, flank pain and frequency  Musculoskeletal: Negative for arthralgias, back pain, gait problem and myalgias  Skin: Negative for color change  Allergic/Immunologic: Negative for environmental allergies  Neurological: Negative for dizziness, numbness and headaches  Psychiatric/Behavioral: Negative for behavioral problems and sleep disturbance  Video Exam    There were no vitals filed for this visit  Physical Exam  Constitutional:       General: She is not in acute distress  Appearance: She is well-developed  She is not ill-appearing or toxic-appearing  HENT:      Head: Normocephalic and atraumatic  Not macrocephalic and not microcephalic  Eyes:      General: Lids are normal       Conjunctiva/sclera: Conjunctivae normal       Pupils: Pupils are equal, round, and reactive to light  Pulmonary:      Effort: Pulmonary effort is normal  No respiratory distress  Breath sounds: Normal breath sounds  Musculoskeletal:         General: Normal range of motion  Cervical back: Normal range of motion  Skin:     General: Skin is dry  Findings: No erythema  Neurological:      Cranial Nerves: No cranial nerve deficit  Psychiatric:         Behavior: Behavior normal          Thought Content: Thought content normal          Judgment: Judgment normal               VIRTUAL VISIT [address] [place] [address] verbally agrees to participate in [facility]  Pt is aware that [facility] could be limited without vital signs or the ability to perform a full hands-on physical Avani Purchase understands she or the provider may request at any time to terminate the video visit and request the patient to seek care or treatment in person

## 2022-01-23 DIAGNOSIS — U07.1 COVID-19: ICD-10-CM

## 2022-01-24 RX ORDER — FLUTICASONE FUROATE AND VILANTEROL TRIFENATATE 100; 25 UG/1; UG/1
POWDER RESPIRATORY (INHALATION)
Qty: 60 EACH | Refills: 1 | Status: SHIPPED | OUTPATIENT
Start: 2022-01-24 | End: 2022-06-01

## 2022-02-16 ENCOUNTER — RA CDI HCC (OUTPATIENT)
Dept: OTHER | Facility: HOSPITAL | Age: 47
End: 2022-02-16

## 2022-02-16 NOTE — PROGRESS NOTES
The following dx found on active problem list - please assess using MEAT for  billing      Coagulation defect ([facility] ) [D68 9]  Sjogren's syndrome ([facility] ) [M35 00]    [facility]  coding opportunities          Number of diagnosis code(s) already on the problem list added to  fla                     Patients insurance company: [id number] ([employer]

## 2022-02-22 ENCOUNTER — TELEMEDICINE (OUTPATIENT)
Dept: FAMILY MEDICINE CLINIC | Facility: CLINIC | Age: 47
End: 2022-02-22
Payer: COMMERCIAL

## 2022-02-22 DIAGNOSIS — G43.709 CHRONIC MIGRAINE WITHOUT AURA WITHOUT STATUS MIGRAINOSUS, NOT INTRACTABLE: Primary | ICD-10-CM

## 2022-02-22 DIAGNOSIS — E78.1 HYPERTRIGLYCERIDEMIA: ICD-10-CM

## 2022-02-22 PROCEDURE — 99214 OFFICE O/P EST MOD 30 MIN: CPT | Performed by: FAMILY MEDICINE

## 2022-02-22 PROCEDURE — 1036F TOBACCO NON-USER: CPT | Performed by: FAMILY MEDICINE

## 2022-02-22 RX ORDER — BUTALBITAL, ACETAMINOPHEN AND CAFFEINE 300; 40; 50 MG/1; MG/1; MG/1
1 CAPSULE ORAL EVERY 8 HOURS PRN
Qty: 15 CAPSULE | Refills: 0 | Status: SHIPPED | OUTPATIENT
Start: 2022-02-22 | End: 2022-05-18

## 2022-02-22 RX ORDER — FENOFIBRATE 54 MG/1
54 TABLET ORAL DAILY
Qty: 90 TABLET | Refills: 1 | Status: SHIPPED | OUTPATIENT
Start: 2022-02-22

## 2022-02-22 RX ORDER — SUMATRIPTAN 50 MG/1
TABLET, FILM COATED ORAL
Qty: 10 TABLET | Refills: 0 | Status: SHIPPED | OUTPATIENT
Start: 2022-02-22 | End: 2022-05-18

## 2022-02-22 NOTE — PROGRESS NOTES
Virtual Regular Visit    Verification of patient location:    Patient is located in the following state in which I hold an active license [place]      Assessment/Plan:  1  Chronic migraine without aura without status migrainosus, not intractable    Reviewed patient's symptoms today  At this time, her symptoms appear likely due to a combination of headaches  She appears to have menstrual migraines as well as migraine headaches  She was educated on pathophysiology is problem  She may benefit from taking magnesium oxide 400 mg daily  She may start with ibuprofen periodically for symptom relief  She was advised that she may highly benefit from taking sumatriptan at as an abortive medication for headache  If her headaches are not improving with this current regimen, she may take Fioricet as well  If her symptoms are not improving she was advised to call or follow up  I have spent 19 minutes with Patient  today in which greater than 50% of this time was spent in counseling/coordination of care regarding Diagnostic results, Prognosis, Risks and benefits of tx options, Intructions for management, Patient and family education, Importance of tx compliance, Risk factor reductions and Impressions  Problem List Items Addressed This Visit     None               Reason for visit is   Chief Complaint   Patient presents with    Virtual Regular Visit        Encounter provider Nan Gordon DO    Provider located at [place] [address]  [address] [place] [address]  [phone]      Recent Visits  No visits were found meeting these conditions  Showing recent visits within past 7 days and meeting all other requirements  Today's Visits  Date Type Provider Dept   02/22/22 Telemedicine DO [facility]   Showing today's visits and meeting all other requirements  Future Appointments  No visits were found meeting these conditions    Showing future appointments within next 150 days and meeting all other requirements       The patient was identified by name and date of birth  Sotero Anna was informed that this is a telemedicine visit and that the visit is being conducted through [facility] and patient was informed this is a secure, HIPAA-complaint platform  She agrees to proceed     My office door was closed  No one else was in the room  She acknowledged consent and understanding of privacy and security of the video platform  The patient has agreed to participate and understands they can discontinue the visit at any time  Patient is aware this is a billable service  Rm Alejandra is a 51 y o  female Presents today for an ED follow-up  She is also discussing her headaches  She states that she was seen in the ED at the end of December for her COVID  At that time, she did have persistent headaches as well she was prescribed Fioricet by the ED staff  She states that this was effective for her headaches  Previously she states that she always has been developing headaches  This was always usually surrounded by her menstrual cycle  She states that headaches are usually unilateral   She does have photophobia as well as phonophobia  She would take ibuprofen past and this periodically would help subside her headaches  She would like to discuss other treatment options for this  HPI     No past medical history on file  No past surgical history on file  Current Outpatient Medications   Medication Sig Dispense Refill    albuterol (Ventolin HFA) 90 mcg/act inhaler Inhale 2 puffs every 6 (six) hours as needed for wheezing 6 7 g 0    ALPRAZolam (XANAX) 0 5 mg tablet Take 1 tablet (0 5 mg total) by mouth 2 (two) times a day as needed for anxiety 180 tablet 0    aspirin 81 MG tablet Take 1 tablet by mouth daily      Breo Ellipta 100-25 MCG/INH inhaler INHALE 1 PUFF BY MOUTH DAILY   RINSE MOUTH AFTER USE 60 each 1    citalopram (CeleXA) 20 mg tablet Take 1 tablet (20 mg total) by mouth daily 90 tablet 1    Efinaconazole 10 % SOLN Apply 1 application topically daily 8 mL 1    ezetimibe (ZETIA) 10 mg tablet Take 1 tablet (10 mg total) by mouth daily 90 tablet 1    fenofibrate (TRICOR) 54 MG tablet Take 1 tablet (54 mg total) by mouth daily 30 tablet 5    fluticasone (FLONASE) 50 mcg/act nasal spray       gabapentin (NEURONTIN) 300 mg capsule TAKE 1 CAPSULE(300 MG) BY MOUTH DAILY AT BEDTIME 90 capsule 0    levothyroxine 100 mcg tablet TAKE 1 TABLET(100 MCG) BY MOUTH DAILY 90 tablet 1    meloxicam (MOBIC) 15 mg tablet Take 1 tablet (15 mg total) by mouth daily 30 tablet 0    predniSONE 10 mg tablet Five p o  for 2 days Four p o  for 2 Three p o  for 2 days Two p o  for 2 days One p o  for 2 days 30 tablet 0     No current facility-administered medications for this visit  Allergies   Allergen Reactions    Clindamycin     Codeine Hives and Other (See Comments)     hives, dilaudid ok  hives, dilaudid ok    Other Other (See Comments)     morphine=hives    Penicillins Hives and Other (See Comments)    Red Dye - Food Allergy     Morphine Hives       Review of Systems   Constitutional: Negative for activity change, chills, fatigue and fever  HENT: Negative for congestion, ear pain, sinus pressure and sore throat  Eyes: Negative for redness, itching and visual disturbance  Respiratory: Negative for cough and shortness of breath  Cardiovascular: Negative for chest pain and palpitations  Gastrointestinal: Negative for abdominal pain, diarrhea and nausea  Endocrine: Negative for cold intolerance and heat intolerance  Genitourinary: Negative for dysuria, flank pain and frequency  Musculoskeletal: Negative for arthralgias, back pain, gait problem and myalgias  Skin: Negative for color change  Allergic/Immunologic: Negative for environmental allergies     Neurological: Negative for dizziness, numbness and headaches  Psychiatric/Behavioral: Negative for behavioral problems and sleep disturbance  Video Exam    There were no vitals filed for this visit  Physical Exam  Constitutional:       General: She is not in acute distress  Appearance: She is well-developed  She is not ill-appearing or toxic-appearing  HENT:      Head: Normocephalic and atraumatic  Not macrocephalic and not microcephalic  Eyes:      General: Lids are normal       Conjunctiva/sclera: Conjunctivae normal       Pupils: Pupils are equal, round, and reactive to light  Pulmonary:      Effort: Pulmonary effort is normal  No respiratory distress  Breath sounds: Normal breath sounds  Musculoskeletal:         General: Normal range of motion  Cervical back: Normal range of motion  Skin:     General: Skin is dry  Findings: No erythema  Neurological:      Cranial Nerves: No cranial nerve deficit  Psychiatric:         Behavior: Behavior normal          Thought Content: Thought content normal          Judgment: Judgment normal               VIRTUAL VISIT [address] [place] [address] verbally agrees to participate in [employer]  Pt is aware that [employer] could be limited without vital signs or the ability to perform a full hands-on physical Linsey Whitley understands she or the provider may request at any time to terminate the video visit and request the patient to seek care or treatment in person

## 2022-03-03 DIAGNOSIS — F41.9 ANXIETY: ICD-10-CM

## 2022-03-03 RX ORDER — ALPRAZOLAM 0.5 MG/1
TABLET ORAL
Qty: 180 TABLET | Refills: 0 | Status: SHIPPED | OUTPATIENT
Start: 2022-03-03 | End: 2022-05-31

## 2022-03-23 ENCOUNTER — TELEPHONE (OUTPATIENT)
Dept: FAMILY MEDICINE CLINIC | Facility: CLINIC | Age: 47
End: 2022-03-23

## 2022-03-23 NOTE — TELEPHONE ENCOUNTER
----- Message from Charles Roldan DO sent at 3/22/2022  9:16 AM EDT -----  Regarding: FW: Bloodwork   Please call patient, she may complete her blood work whenever she would like  I am adding a iron panel to her blood work to check for any deficiencies that can possibly cause her hair loss  Thank you  ----- Message -----  From: Ke Mckeon MA  Sent: 3/22/2022   8:32 AM EDT  To: Charles Roldan DO  Subject: FW: Bloodwork                                      ----- Message -----  From: Joyce Collado  Sent: 3/22/2022   8:12 AM EDT  To: [facility]  Subject: Bloodwork                                        Good morning  I am supposed to get my follow up blood work done the first week of April  I have been sick for just about 5 weeks with cold like symptoms  I caught something that lingered and just as that seemed to resolve I caught something else  I am on the mend but want to be sure that will not skew my results in any way  Should I still get the bloodwork or wait? My biggest concern right now is that my hair was down to my tailbone and since I recovered from Covid it has been falling out in clumps  I had to get 12 inches cut off and it continues to fall out excessively  The hair loss is devastating  Is this from the Covid infection or could it be from a new medication? Thank you

## 2022-03-23 NOTE — TELEPHONE ENCOUNTER
I called patient and relayed the message from Dr Kosta Marin that the bloodwork labs are in her chart ready to be done (extra iron panel to check for hair loss)

## 2022-04-01 ENCOUNTER — APPOINTMENT (OUTPATIENT)
Dept: LAB | Facility: CLINIC | Age: 47
End: 2022-04-01
Payer: COMMERCIAL

## 2022-04-01 DIAGNOSIS — R73.03 PREDIABETES: ICD-10-CM

## 2022-04-01 DIAGNOSIS — E03.9 HYPOTHYROIDISM, UNSPECIFIED TYPE: ICD-10-CM

## 2022-04-01 DIAGNOSIS — E78.1 HYPERTRIGLYCERIDEMIA: ICD-10-CM

## 2022-04-01 DIAGNOSIS — D64.9 ANEMIA, UNSPECIFIED TYPE: ICD-10-CM

## 2022-04-01 LAB
ALBUMIN SERPL BCP-MCNC: 4.1 G/DL (ref 3.5–5)
ALP SERPL-CCNC: 52 U/L (ref 46–116)
ALT SERPL W P-5'-P-CCNC: 22 U/L (ref 12–78)
ANION GAP SERPL CALCULATED.3IONS-SCNC: 5 MMOL/L (ref 4–13)
AST SERPL W P-5'-P-CCNC: 15 U/L (ref 5–45)
BILIRUB SERPL-MCNC: 0.4 MG/DL (ref 0.2–1)
BUN SERPL-MCNC: 12 MG/DL (ref 5–25)
CALCIUM SERPL-MCNC: 8.8 MG/DL (ref 8.3–10.1)
CHLORIDE SERPL-SCNC: 109 MMOL/L (ref 100–108)
CHOLEST SERPL-MCNC: 208 MG/DL
CO2 SERPL-SCNC: 25 MMOL/L (ref 21–32)
CREAT SERPL-MCNC: 0.89 MG/DL (ref 0.6–1.3)
ERYTHROCYTE [DISTWIDTH] IN BLOOD BY AUTOMATED COUNT: 14.8 % (ref 11.6–15.1)
EST. AVERAGE GLUCOSE BLD GHB EST-MCNC: 114 MG/DL
FERRITIN SERPL-MCNC: 4 NG/ML (ref 8–388)
GFR SERPL CREATININE-BSD FRML MDRD: 77 ML/MIN/1.73SQ M
GLUCOSE P FAST SERPL-MCNC: 110 MG/DL (ref 65–99)
HBA1C MFR BLD: 5.6 %
HCT VFR BLD AUTO: 33.6 % (ref 34.8–46.1)
HDLC SERPL-MCNC: 54 MG/DL
HGB BLD-MCNC: 10.4 G/DL (ref 11.5–15.4)
IRON SATN MFR SERPL: 16 % (ref 15–50)
IRON SERPL-MCNC: 74 UG/DL (ref 50–170)
LDLC SERPL CALC-MCNC: 111 MG/DL (ref 0–100)
MCH RBC QN AUTO: 27.7 PG (ref 26.8–34.3)
MCHC RBC AUTO-ENTMCNC: 31 G/DL (ref 31.4–37.4)
MCV RBC AUTO: 90 FL (ref 82–98)
PLATELET # BLD AUTO: 270 THOUSANDS/UL (ref 149–390)
PMV BLD AUTO: 12.1 FL (ref 8.9–12.7)
POTASSIUM SERPL-SCNC: 4.2 MMOL/L (ref 3.5–5.3)
PROT SERPL-MCNC: 7 G/DL (ref 6.4–8.2)
RBC # BLD AUTO: 3.75 MILLION/UL (ref 3.81–5.12)
SODIUM SERPL-SCNC: 139 MMOL/L (ref 136–145)
TIBC SERPL-MCNC: 471 UG/DL (ref 250–450)
TRIGL SERPL-MCNC: 215 MG/DL
TSH SERPL DL<=0.05 MIU/L-ACNC: 3.55 UIU/ML (ref 0.36–3.74)
WBC # BLD AUTO: 5.36 THOUSAND/UL (ref 4.31–10.16)

## 2022-04-01 PROCEDURE — 36415 COLL VENOUS BLD VENIPUNCTURE: CPT

## 2022-04-01 PROCEDURE — 83036 HEMOGLOBIN GLYCOSYLATED A1C: CPT

## 2022-04-01 PROCEDURE — 80053 COMPREHEN METABOLIC PANEL: CPT

## 2022-04-01 PROCEDURE — 83550 IRON BINDING TEST: CPT

## 2022-04-01 PROCEDURE — 83540 ASSAY OF IRON: CPT

## 2022-04-01 PROCEDURE — 80061 LIPID PANEL: CPT

## 2022-04-01 PROCEDURE — 84443 ASSAY THYROID STIM HORMONE: CPT

## 2022-04-01 PROCEDURE — 82728 ASSAY OF FERRITIN: CPT

## 2022-04-01 PROCEDURE — 85027 COMPLETE CBC AUTOMATED: CPT

## 2022-04-08 ENCOUNTER — TELEPHONE (OUTPATIENT)
Dept: FAMILY MEDICINE CLINIC | Facility: CLINIC | Age: 47
End: 2022-04-08

## 2022-04-08 NOTE — TELEPHONE ENCOUNTER
Pt LAYNE stating she received her BW results from you and would like to know if she can stop Fenofibrate because of her hair loss

## 2022-04-09 NOTE — TELEPHONE ENCOUNTER
I called pt and she would like you to put her labs in and she has a question about taking her iron  Is there a more gentle way of taking iron? No matter if on an empty or full stomach she always feels sick from it

## 2022-04-19 DIAGNOSIS — G89.29 CHRONIC RIGHT-SIDED THORACIC BACK PAIN: ICD-10-CM

## 2022-04-19 DIAGNOSIS — M54.6 CHRONIC RIGHT-SIDED THORACIC BACK PAIN: ICD-10-CM

## 2022-04-19 DIAGNOSIS — M54.2 CERVICALGIA: ICD-10-CM

## 2022-04-20 RX ORDER — GABAPENTIN 300 MG/1
CAPSULE ORAL
Qty: 90 CAPSULE | Refills: 0 | Status: SHIPPED | OUTPATIENT
Start: 2022-04-20

## 2022-05-17 DIAGNOSIS — G43.709 CHRONIC MIGRAINE WITHOUT AURA WITHOUT STATUS MIGRAINOSUS, NOT INTRACTABLE: ICD-10-CM

## 2022-05-18 RX ORDER — SUMATRIPTAN 50 MG/1
TABLET, FILM COATED ORAL
Qty: 10 TABLET | Refills: 0 | Status: SHIPPED | OUTPATIENT
Start: 2022-05-18

## 2022-05-18 RX ORDER — BUTALBITAL, ACETAMINOPHEN AND CAFFEINE 300; 40; 50 MG/1; MG/1; MG/1
CAPSULE ORAL
Qty: 15 CAPSULE | Refills: 0 | Status: SHIPPED | OUTPATIENT
Start: 2022-05-18 | End: 2022-07-27

## 2022-05-26 DIAGNOSIS — E03.9 HYPOTHYROIDISM, UNSPECIFIED TYPE: ICD-10-CM

## 2022-05-26 RX ORDER — LEVOTHYROXINE SODIUM 0.1 MG/1
100 TABLET ORAL DAILY
Qty: 90 TABLET | Refills: 1 | Status: SHIPPED | OUTPATIENT
Start: 2022-05-26

## 2022-05-31 DIAGNOSIS — F41.9 ANXIETY: ICD-10-CM

## 2022-05-31 RX ORDER — ALPRAZOLAM 0.5 MG/1
TABLET ORAL
Qty: 180 TABLET | Refills: 0 | Status: SHIPPED | OUTPATIENT
Start: 2022-05-31

## 2022-08-15 ENCOUNTER — TELEPHONE (OUTPATIENT)
Dept: ADMINISTRATIVE | Facility: OTHER | Age: 47
End: 2022-08-15

## 2022-08-15 NOTE — TELEPHONE ENCOUNTER
----- Message from Elen Cortes MA sent at 8/12/2022  1:11 PM EDT -----  Regarding: Care Gap Request  08/12/22 1:11 PM    Hello, our patient Monalisa Pabon has had Mammogram completed/performed  Please assist in updating the patient chart by pulling the Care Everywhere (CE) document  The date of service is 10/6/21       Thank you,  Elen Cortes MA  [facility]

## 2022-08-15 NOTE — TELEPHONE ENCOUNTER
Upon review of the In Basket request we were able to locate, review, and update the patient chart as requested for HIV and Mammogram     Any additional questions or concerns should be emailed to the Practice Liaisons via [email or web address] email, please do not reply via In Basket      Thank you  Jian Mcdonald No

## 2022-08-15 NOTE — TELEPHONE ENCOUNTER
----- Message from James Eng MA sent at 8/12/2022  1:12 PM EDT -----  Regarding: Care Gap Request  08/12/22 1:12 PM    Hello, our patient Rosalio Hansen has had HIV completed/performed  Please assist in updating the patient chart by pulling the Care Everywhere (CE) document  The date of service is 3/17/09       Thank you,  James Eng MA  [facility]

## 2022-08-29 NOTE — TELEPHONE ENCOUNTER
Please call patient, please advise her that she is not quite at goal  However if shes having hair loss, she may discontinue this treatment  We will need to check bloodwork again in three months  If her triglycerides increase significantly, she will likely need to start a different medication for her triglycerides  thank you headache

## 2022-08-30 DIAGNOSIS — F41.9 ANXIETY: ICD-10-CM

## 2022-08-30 DIAGNOSIS — G43.709 CHRONIC MIGRAINE WITHOUT AURA WITHOUT STATUS MIGRAINOSUS, NOT INTRACTABLE: ICD-10-CM

## 2022-08-31 RX ORDER — ALPRAZOLAM 0.5 MG/1
TABLET ORAL
Qty: 60 TABLET | Refills: 0 | Status: SHIPPED | OUTPATIENT
Start: 2022-08-31 | End: 2022-09-23 | Stop reason: SDUPTHER

## 2022-08-31 RX ORDER — SUMATRIPTAN 50 MG/1
TABLET, FILM COATED ORAL
Qty: 10 TABLET | Refills: 0 | Status: SHIPPED | OUTPATIENT
Start: 2022-08-31

## 2022-09-14 ENCOUNTER — PATIENT MESSAGE (OUTPATIENT)
Dept: FAMILY MEDICINE CLINIC | Facility: CLINIC | Age: 47
End: 2022-09-14

## 2022-09-14 DIAGNOSIS — G89.29 CHRONIC RIGHT-SIDED THORACIC BACK PAIN: ICD-10-CM

## 2022-09-14 DIAGNOSIS — M54.6 CHRONIC RIGHT-SIDED THORACIC BACK PAIN: ICD-10-CM

## 2022-09-14 DIAGNOSIS — M54.2 CERVICALGIA: ICD-10-CM

## 2022-09-16 RX ORDER — GABAPENTIN 300 MG/1
300 CAPSULE ORAL
Qty: 90 CAPSULE | Refills: 0 | Status: SHIPPED | OUTPATIENT
Start: 2022-09-16

## 2022-09-23 ENCOUNTER — OFFICE VISIT (OUTPATIENT)
Dept: FAMILY MEDICINE CLINIC | Facility: CLINIC | Age: 47
End: 2022-09-23
Payer: COMMERCIAL

## 2022-09-23 ENCOUNTER — TELEPHONE (OUTPATIENT)
Dept: PODIATRY | Facility: CLINIC | Age: 47
End: 2022-09-23

## 2022-09-23 VITALS
DIASTOLIC BLOOD PRESSURE: 75 MMHG | TEMPERATURE: 98.9 F | HEART RATE: 94 BPM | SYSTOLIC BLOOD PRESSURE: 130 MMHG | OXYGEN SATURATION: 98 %

## 2022-09-23 DIAGNOSIS — F41.1 GAD (GENERALIZED ANXIETY DISORDER): ICD-10-CM

## 2022-09-23 DIAGNOSIS — D50.9 IRON DEFICIENCY ANEMIA, UNSPECIFIED IRON DEFICIENCY ANEMIA TYPE: ICD-10-CM

## 2022-09-23 DIAGNOSIS — N94.6 DYSMENORRHEA: ICD-10-CM

## 2022-09-23 DIAGNOSIS — B35.1 ONYCHOMYCOSIS: ICD-10-CM

## 2022-09-23 DIAGNOSIS — E78.5 DYSLIPIDEMIA: ICD-10-CM

## 2022-09-23 DIAGNOSIS — E03.9 HYPOTHYROIDISM, UNSPECIFIED TYPE: ICD-10-CM

## 2022-09-23 DIAGNOSIS — F41.9 ANXIETY: Primary | ICD-10-CM

## 2022-09-23 DIAGNOSIS — G43.709 CHRONIC MIGRAINE WITHOUT AURA WITHOUT STATUS MIGRAINOSUS, NOT INTRACTABLE: ICD-10-CM

## 2022-09-23 DIAGNOSIS — R43.0 ANOSMIA: ICD-10-CM

## 2022-09-23 DIAGNOSIS — E78.1 HYPERTRIGLYCERIDEMIA: ICD-10-CM

## 2022-09-23 DIAGNOSIS — R73.03 PREDIABETES: ICD-10-CM

## 2022-09-23 PROCEDURE — 3725F SCREEN DEPRESSION PERFORMED: CPT | Performed by: FAMILY MEDICINE

## 2022-09-23 PROCEDURE — 99215 OFFICE O/P EST HI 40 MIN: CPT | Performed by: FAMILY MEDICINE

## 2022-09-23 RX ORDER — CITALOPRAM 20 MG/1
20 TABLET ORAL DAILY
Qty: 90 TABLET | Refills: 1 | Status: SHIPPED | OUTPATIENT
Start: 2022-09-23

## 2022-09-23 RX ORDER — FENOFIBRATE 54 MG/1
54 TABLET ORAL DAILY
Qty: 90 TABLET | Refills: 1 | Status: SHIPPED | OUTPATIENT
Start: 2022-09-23

## 2022-09-23 RX ORDER — EZETIMIBE 10 MG/1
10 TABLET ORAL DAILY
Qty: 90 TABLET | Refills: 1 | Status: SHIPPED | OUTPATIENT
Start: 2022-09-23

## 2022-09-23 RX ORDER — ALPRAZOLAM 0.5 MG/1
0.5 TABLET ORAL 2 TIMES DAILY PRN
Qty: 180 TABLET | Refills: 0 | Status: SHIPPED | OUTPATIENT
Start: 2022-09-23 | End: 2022-12-22

## 2022-09-23 NOTE — PROGRESS NOTES
Assessment/Plan:   1  FRAN (generalized anxiety disorder)  Reviewed patient's symptoms today  At this time, symptoms appear stable  Will continue with her current treatment of Celexa as well as her alprazolam p r n  [place] PDMP reviewed  No abnormality seen today   - TSH, 3rd generation with Free T4 reflex; Future  - ALPRAZolam (XANAX) 0 5 mg tablet; Take 1 tablet (0 5 mg total) by mouth 2 (two) times a day as needed for anxiety  Dispense: 180 tablet; Refill: 0  - citalopram (CeleXA) 20 mg tablet; Take 1 tablet (20 mg total) by mouth daily  Dispense: 90 tablet; Refill: 1    2  Chronic migraine without aura without status migrainosus, not intractable  Stable  At this time, she denies any recent exacerbations  Will continue with her current treatment of Fioricet  3  Dyslipidemia  Unclear precise control  Recheck lipid panel  Continue with her current dose of Zetia as well as her fenofibrate  - Comprehensive metabolic panel; Future  - Lipid Panel with Direct LDL reflex; Future  - ezetimibe (ZETIA) 10 mg tablet; Take 1 tablet (10 mg total) by mouth daily  Dispense: 90 tablet; Refill: 1    4  Hypothyroidism, unspecified type  Recheck thyroid function testing  Continue with current dose of levothyroxine    5  Prediabetes  Patient's A1c has been previously elevated  At this time, she was advised that she may highly on treatment metformin 5 mg once daily to further minimize her insulin resistance  - Hemoglobin A1C; Future    6  Anosmia  Reviewed patient's symptoms  At this time, her symptoms are secondary to post acute sequelae of COVID infection  Will refer patient to otolaryngology for further evaluation   - Ambulatory Referral to Otolaryngology; Future    7  Onychomycosis  Reviewed patient's symptoms today  At this time, symptoms appear concerning for onychomycosis  She does have significant pain in this area    Will for patient to Podiatry for further evaluation   - Ambulatory Referral to Podiatry; Future    8  Iron deficiency anemia, unspecified iron deficiency anemia type  Patient previous had problems with iron deficiency  At this time, will check her hormone levels as well as her iron panel  Given her persistent iron deficiency as well as her intolerance of oral iron supplementation, she may benefit from seeing hematology for possible iron infusions   - CBC; Future  - Iron Panel (Includes Ferritin, Iron Sat%, Iron, and TIBC); Future    9  Dysmenorrhea  - Follicle stimulating hormone; Future  - Luteinizing hormone; Future  - Estradiol; Future    10  Hypertriglyceridemia  Follow up with patient in 6 months  - fenofibrate (TRICOR) 54 MG tablet; Take 1 tablet (54 mg total) by mouth daily  Dispense: 90 tablet; Refill: 1               There are no diagnoses linked to this encounter  Subjective:       Chief Complaint   Patient presents with    Medication Management     Med Check      Patient ID: Pearl Almaguer is a 49 y o  female presents today for a follow-up on her chronic conditions  She has generalized anxiety disorder, chronic migraines, dyslipidemia, hypothyroidism, prediabetes  She has complaints today of difficulty with smelling  She has had this problem since she had COVID 9 months ago  She also has been having problems with a onychomycosis over her right great toe  She has been having moderate pain over this area  She has been having difficulty walking as well  HPI    Review of Systems   Constitutional: Negative for activity change, chills, fatigue and fever  HENT: Negative for congestion, ear pain, sinus pressure and sore throat  Eyes: Negative for redness, itching and visual disturbance  Respiratory: Negative for cough and shortness of breath  Cardiovascular: Negative for chest pain and palpitations  Gastrointestinal: Negative for abdominal pain, diarrhea and nausea  Endocrine: Negative for cold intolerance and heat intolerance     Genitourinary: Negative for dysuria, flank pain and frequency  Musculoskeletal: Negative for arthralgias, back pain, gait problem and myalgias  Skin: Negative for color change  Allergic/Immunologic: Negative for environmental allergies  Neurological: Negative for dizziness, numbness and headaches  Psychiatric/Behavioral: Negative for behavioral problems and sleep disturbance  The following portions of the patient's history were reviewed and updated as appropriate : past family history, past medical history, past social history and past surgical history  Current Outpatient Medications:     ALPRAZolam (XANAX) 0 5 mg tablet, TAKE 1 TABLET(0 5 MG) BY MOUTH TWICE DAILY AS NEEDED FOR ANXIETY, Disp: 60 tablet, Rfl: 0    aspirin 81 MG tablet, Take 1 tablet by mouth daily, Disp: , Rfl:     Butalbital-APAP-Caffeine -40 MG CAPS, TAKE 1 CAPSULE BY MOUTH EVERY 8 HOURS AS NEEDED FOR MIGRAINE, Disp: 15 capsule, Rfl: 0    citalopram (CeleXA) 20 mg tablet, Take 1 tablet (20 mg total) by mouth daily, Disp: 90 tablet, Rfl: 1    ezetimibe (ZETIA) 10 mg tablet, Take 1 tablet (10 mg total) by mouth daily, Disp: 90 tablet, Rfl: 1    fenofibrate (TRICOR) 54 MG tablet, Take 1 tablet (54 mg total) by mouth daily, Disp: 90 tablet, Rfl: 1    fluticasone (FLONASE) 50 mcg/act nasal spray, , Disp: , Rfl:     gabapentin (NEURONTIN) 300 mg capsule, Take 1 capsule (300 mg total) by mouth daily at bedtime, Disp: 90 capsule, Rfl: 0    levothyroxine 100 mcg tablet, Take 1 tablet (100 mcg total) by mouth daily, Disp: 90 tablet, Rfl: 1    SUMAtriptan (IMITREX) 50 mg tablet, TAKE 1 TABLET BY MOUTH AT ONSET OF MIGRAINE HEADACHE   MAY REPEAT AFTER 2 HOURS IF HEADACHE IS STILL PERSISTING, Disp: 10 tablet, Rfl: 0    Efinaconazole 10 % SOLN, Apply 1 application topically daily (Patient not taking: Reported on 9/23/2022), Disp: 8 mL, Rfl: 1         Objective:         Vitals:    09/23/22 1542   BP: 130/75   BP Location: Left arm   Patient Position: Sitting   Cuff Size: Adult   Pulse: 94   Temp: 98 9 °F (37 2 °C)   TempSrc: Tympanic   SpO2: 98%     Physical Exam  Vitals reviewed  Constitutional:       Appearance: She is well-developed  HENT:      Head: Normocephalic and atraumatic  Nose: Nose normal       Mouth/Throat:      Pharynx: No oropharyngeal exudate  Eyes:      General: No scleral icterus  Right eye: No discharge  Left eye: No discharge  Pupils: Pupils are equal, round, and reactive to light  Neck:      Trachea: No tracheal deviation  Cardiovascular:      Rate and Rhythm: Normal rate and regular rhythm  Pulses:           Dorsalis pedis pulses are 2+ on the right side and 2+ on the left side  Posterior tibial pulses are 2+ on the right side and 2+ on the left side  Heart sounds: Normal heart sounds  No murmur heard  No friction rub  No gallop  Pulmonary:      Effort: Pulmonary effort is normal  No respiratory distress  Breath sounds: Normal breath sounds  No wheezing or rales  Abdominal:      General: Bowel sounds are normal  There is no distension  Palpations: Abdomen is soft  Tenderness: There is no abdominal tenderness  There is no guarding or rebound  Musculoskeletal:         General: Normal range of motion  Cervical back: Normal range of motion and neck supple  Lymphadenopathy:      Head:      Right side of head: No submental or submandibular adenopathy  Left side of head: No submental or submandibular adenopathy  Cervical: No cervical adenopathy  Right cervical: No superficial, deep or posterior cervical adenopathy  Left cervical: No superficial, deep or posterior cervical adenopathy  Skin:     General: Skin is warm and dry  Findings: No erythema  Neurological:      Mental Status: She is alert and oriented to person, place, and time  Cranial Nerves: No cranial nerve deficit  Sensory: No sensory deficit     Psychiatric:         Mood and Affect: Mood is not anxious or depressed  Speech: Speech normal          Behavior: Behavior normal          Thought Content:  Thought content normal          Judgment: Judgment normal

## 2022-09-26 ENCOUNTER — TELEPHONE (OUTPATIENT)
Dept: ADMINISTRATIVE | Facility: OTHER | Age: 47
End: 2022-09-26

## 2022-09-26 NOTE — TELEPHONE ENCOUNTER
Call placed to patient and LM in regards to same  Opening held for patient for 9/28/22 at [address] [facility] in [facility]  Will await call back

## 2022-09-26 NOTE — TELEPHONE ENCOUNTER
----- Message from Phuc Lawler MA sent at 9/23/2022  3:55 PM EDT -----  Regarding: Care Gap Request  09/23/22 3:55 PM    Hello, our patient Ben Salcdeo has had CRC: Colonoscopy completed/performed  Please assist in updating the patient chart by making an External outreach to Dr Ajit Alba facility located in [facility]  The date of service is approx 2015      Thank you,  Phuc Lawler MA  [facility]

## 2022-09-26 NOTE — TELEPHONE ENCOUNTER
Upon review of the In Basket request we were able to locate, review, and update the patient chart as requested for CRC: Colonoscopy  Any additional questions or concerns should be emailed to the Practice Liaisons via [email or web address] email, please do not reply via In Basket      Thank you  Brayden Callejas MA

## 2022-09-27 ENCOUNTER — TELEPHONE (OUTPATIENT)
Dept: FAMILY MEDICINE CLINIC | Facility: CLINIC | Age: 47
End: 2022-09-27

## 2022-09-27 DIAGNOSIS — N92.4 EXCESSIVE BLEEDING IN PREMENOPAUSAL PERIOD: Primary | ICD-10-CM

## 2022-09-27 NOTE — TELEPHONE ENCOUNTER
----- Message from Sabina Dinero DO sent at 9/27/2022  9:50 AM EDT -----  Regarding: FW: Visit Follow Up   Call patient, ultrasound was ordered  The blood work that I ordered for her is sufficient for her concerns  I will not order a MRI or a CT of her brain as this is not clinically indicated and will not be covered by her insurance  ----- Message -----  From: Shane Philippe MA  Sent: 9/27/2022   7:53 AM EDT  To: Sabina Dinero DO  Subject: FW: Visit Follow Up                                ----- Message -----  From: Abel Spence  Sent: 9/27/2022   6:45 AM EDT  To: [facility]  Subject: Visit Follow Up                                  Good morning  It was nice seeing you and thank you for helping me as always  3 days ago I got my period and the first 3 days were very heavy  Can you please order a trans abdominal ultra sound so that we can see if everything g looks normal?    Additionally, its my understanding that flaco-menopause can start 10 years prior to period changes and actual menopause  I assume the blood work you ordered will give us that information? Lastly, with my headaches and other brain related concerns we discussed can I please get a brain MRI to rule out anything significant  Ive already had a CT scan but would feel more comfortable getting an MRI  Thank you so much!

## 2022-09-28 ENCOUNTER — TELEPHONE (OUTPATIENT)
Dept: PODIATRY | Facility: CLINIC | Age: 47
End: 2022-09-28

## 2022-09-28 NOTE — TELEPHONE ENCOUNTER
Call placed to patient and LM in regards to same  Explained that appt was scheduled for her for today 9/28/22 @ 500pm at the [facility] office and to call back if that appt was a problem

## 2022-09-28 NOTE — TELEPHONE ENCOUNTER
Call placed to patient and LM in regards to same  Told patient it is either next Wednesday in [facility] or to travel to the [facility] office  Direct contact information given on message  Will await call back

## 2022-09-30 NOTE — TELEPHONE ENCOUNTER
Spoke with patient  She informed me that it would need to be a surgical consult now for bunion removal and toenail removal   She could not schedule at the time of our phone call  Patient states that she will call back when she gets to her schedule  I informed her to call the main office phone number and schedule for the next available appt, no double booking required

## 2022-10-09 DIAGNOSIS — G43.709 CHRONIC MIGRAINE WITHOUT AURA WITHOUT STATUS MIGRAINOSUS, NOT INTRACTABLE: ICD-10-CM

## 2022-10-10 RX ORDER — BUTALBITAL, ACETAMINOPHEN AND CAFFEINE 300; 40; 50 MG/1; MG/1; MG/1
CAPSULE ORAL
Qty: 15 CAPSULE | Refills: 0 | Status: SHIPPED | OUTPATIENT
Start: 2022-10-10

## 2022-10-12 PROBLEM — U07.1 PNEUMONIA DUE TO COVID-19 VIRUS: Status: RESOLVED | Noted: 2022-01-05 | Resolved: 2022-10-12

## 2022-10-12 PROBLEM — J12.82 PNEUMONIA DUE TO COVID-19 VIRUS: Status: RESOLVED | Noted: 2022-01-05 | Resolved: 2022-10-12

## 2022-11-25 ENCOUNTER — TELEPHONE (OUTPATIENT)
Dept: FAMILY MEDICINE CLINIC | Facility: CLINIC | Age: 47
End: 2022-11-25

## 2022-12-16 DIAGNOSIS — E03.9 HYPOTHYROIDISM, UNSPECIFIED TYPE: ICD-10-CM

## 2022-12-16 DIAGNOSIS — M54.2 CERVICALGIA: ICD-10-CM

## 2022-12-16 DIAGNOSIS — E78.1 HYPERTRIGLYCERIDEMIA: ICD-10-CM

## 2022-12-16 DIAGNOSIS — M54.6 CHRONIC RIGHT-SIDED THORACIC BACK PAIN: ICD-10-CM

## 2022-12-16 DIAGNOSIS — G89.29 CHRONIC RIGHT-SIDED THORACIC BACK PAIN: ICD-10-CM

## 2022-12-16 DIAGNOSIS — F41.1 GAD (GENERALIZED ANXIETY DISORDER): ICD-10-CM

## 2022-12-16 RX ORDER — FENOFIBRATE 54 MG/1
54 TABLET ORAL DAILY
Qty: 90 TABLET | Refills: 0 | Status: SHIPPED | OUTPATIENT
Start: 2022-12-16

## 2022-12-16 RX ORDER — LEVOTHYROXINE SODIUM 0.1 MG/1
100 TABLET ORAL DAILY
Qty: 90 TABLET | Refills: 1 | Status: SHIPPED | OUTPATIENT
Start: 2022-12-16

## 2022-12-16 RX ORDER — ALPRAZOLAM 0.5 MG/1
0.5 TABLET ORAL 2 TIMES DAILY PRN
Qty: 180 TABLET | Refills: 0 | Status: SHIPPED | OUTPATIENT
Start: 2022-12-22 | End: 2023-03-22

## 2022-12-16 RX ORDER — GABAPENTIN 300 MG/1
CAPSULE ORAL
Qty: 90 CAPSULE | Refills: 0 | Status: CANCELLED | OUTPATIENT
Start: 2022-12-16

## 2023-01-04 ENCOUNTER — TELEPHONE (OUTPATIENT)
Dept: FAMILY MEDICINE CLINIC | Facility: CLINIC | Age: 48
End: 2023-01-04

## 2023-01-04 NOTE — TELEPHONE ENCOUNTER
Pt lvm stating that she understands about the fasting blood work  She would still like to move forward with medication and she would like to discuss further as she is not comfortable with leaving a detailed message

## 2023-01-04 NOTE — TELEPHONE ENCOUNTER
----- Message from Velasquez Montaño DO sent at 1/3/2023  7:56 PM EST -----  Regarding: FW: Lauren  Contact: [phone]  Please advise patient, before I make the determination if this will be appropriate treatment for her, she needs to complete her fasting blood work  these orders were placed in September  Thank you  ----- Message -----  From: Khris Hein MA  Sent: 1/3/2023   8:19 AM EST  To: Velasquez Montaño DO  Subject: Dre Cox                                       ----- Message -----  From: Asad Segura  Sent: 1/2/2023   9:12 PM EST  To: [facility]  Subject: Jhoana Curry  Happy New Year  At my last visit I inquired about Mounjaro  I feel comfortable enough with what I have read and would like to try it  It is my understanding that it does an excellent job in conjunction with diet and exercise in lowering A1C and glucose  Being glucose intolerant for many years and having an elevated A1C last year I feel makes this a good treatment option  Even with continued and sustained positive dietary changes I would still like my glucose and A1C to be better  I have insurance to cover and a coupon card for the rest as I know it’s expensive  Please send a script over to [facility] and thank you

## 2023-01-11 ENCOUNTER — APPOINTMENT (OUTPATIENT)
Dept: LAB | Facility: CLINIC | Age: 48
End: 2023-01-11

## 2023-01-11 DIAGNOSIS — E78.5 DYSLIPIDEMIA: ICD-10-CM

## 2023-01-11 DIAGNOSIS — D50.9 IRON DEFICIENCY ANEMIA, UNSPECIFIED IRON DEFICIENCY ANEMIA TYPE: ICD-10-CM

## 2023-01-11 DIAGNOSIS — F41.1 GAD (GENERALIZED ANXIETY DISORDER): ICD-10-CM

## 2023-01-11 DIAGNOSIS — R73.03 PREDIABETES: ICD-10-CM

## 2023-01-11 DIAGNOSIS — N94.6 DYSMENORRHEA: ICD-10-CM

## 2023-01-11 LAB
ALBUMIN SERPL BCP-MCNC: 3.8 G/DL (ref 3.5–5)
ALP SERPL-CCNC: 54 U/L (ref 46–116)
ALT SERPL W P-5'-P-CCNC: 17 U/L (ref 12–78)
ANION GAP SERPL CALCULATED.3IONS-SCNC: 6 MMOL/L (ref 4–13)
AST SERPL W P-5'-P-CCNC: 8 U/L (ref 5–45)
BILIRUB SERPL-MCNC: 0.4 MG/DL (ref 0.2–1)
BUN SERPL-MCNC: 13 MG/DL (ref 5–25)
CALCIUM SERPL-MCNC: 9 MG/DL (ref 8.3–10.1)
CHLORIDE SERPL-SCNC: 108 MMOL/L (ref 96–108)
CHOLEST SERPL-MCNC: 228 MG/DL
CO2 SERPL-SCNC: 27 MMOL/L (ref 21–32)
CREAT SERPL-MCNC: 0.93 MG/DL (ref 0.6–1.3)
ERYTHROCYTE [DISTWIDTH] IN BLOOD BY AUTOMATED COUNT: 12.5 % (ref 11.6–15.1)
ESTRADIOL SERPL-MCNC: 25 PG/ML
FERRITIN SERPL-MCNC: 10 NG/ML (ref 8–388)
FSH SERPL-ACNC: 7.4 MIU/ML
GFR SERPL CREATININE-BSD FRML MDRD: 72 ML/MIN/1.73SQ M
GLUCOSE P FAST SERPL-MCNC: 117 MG/DL (ref 65–99)
HCT VFR BLD AUTO: 36 % (ref 34.8–46.1)
HDLC SERPL-MCNC: 44 MG/DL
HGB BLD-MCNC: 12 G/DL (ref 11.5–15.4)
IRON SATN MFR SERPL: 16 % (ref 15–50)
IRON SERPL-MCNC: 71 UG/DL (ref 50–170)
LDLC SERPL CALC-MCNC: 134 MG/DL (ref 0–100)
LH SERPL-ACNC: 7.8 MIU/ML
MCH RBC QN AUTO: 30.5 PG (ref 26.8–34.3)
MCHC RBC AUTO-ENTMCNC: 33.3 G/DL (ref 31.4–37.4)
MCV RBC AUTO: 92 FL (ref 82–98)
PLATELET # BLD AUTO: 249 THOUSANDS/UL (ref 149–390)
PMV BLD AUTO: 11.3 FL (ref 8.9–12.7)
POTASSIUM SERPL-SCNC: 4 MMOL/L (ref 3.5–5.3)
PROT SERPL-MCNC: 7.2 G/DL (ref 6.4–8.4)
RBC # BLD AUTO: 3.93 MILLION/UL (ref 3.81–5.12)
SODIUM SERPL-SCNC: 141 MMOL/L (ref 135–147)
T4 FREE SERPL-MCNC: 0.9 NG/DL (ref 0.76–1.46)
TIBC SERPL-MCNC: 433 UG/DL (ref 250–450)
TRIGL SERPL-MCNC: 249 MG/DL
TSH SERPL DL<=0.05 MIU/L-ACNC: 15.4 UIU/ML (ref 0.45–4.5)
WBC # BLD AUTO: 7.61 THOUSAND/UL (ref 4.31–10.16)

## 2023-01-12 DIAGNOSIS — R73.03 PREDIABETES: ICD-10-CM

## 2023-01-12 DIAGNOSIS — E03.9 HYPOTHYROIDISM, UNSPECIFIED TYPE: Primary | ICD-10-CM

## 2023-01-12 LAB
EST. AVERAGE GLUCOSE BLD GHB EST-MCNC: 120 MG/DL
HBA1C MFR BLD: 5.8 %

## 2023-01-16 ENCOUNTER — TELEPHONE (OUTPATIENT)
Dept: FAMILY MEDICINE CLINIC | Facility: CLINIC | Age: 48
End: 2023-01-16

## 2023-01-16 NOTE — TELEPHONE ENCOUNTER
Enola Olszewski scheduled a future appointment with Dr Yanique Munoz   Based on a previous encounter on 1/10/23  Dr Yanique Munoz would like to discharge patient from his care  I made two attempts to called patient to notify her the appointment scheduled would be canceled  Patient phone range once and went to [facility] box currently full

## 2023-03-16 ENCOUNTER — RA CDI HCC (OUTPATIENT)
Dept: OTHER | Facility: HOSPITAL | Age: 48
End: 2023-03-16

## 2023-03-16 NOTE — PROGRESS NOTES
[facility]  coding opportunities       Chart reviewed, no opportunity found: CHART REVIEWED, NO OPPORTUNITY FOUND        Patients Insurance        Commercial Insurance: [address] [place] [address]

## 2023-03-22 ENCOUNTER — OFFICE VISIT (OUTPATIENT)
Dept: FAMILY MEDICINE CLINIC | Facility: CLINIC | Age: 48
End: 2023-03-22

## 2023-03-22 VITALS
OXYGEN SATURATION: 97 % | DIASTOLIC BLOOD PRESSURE: 86 MMHG | TEMPERATURE: 98.7 F | HEART RATE: 96 BPM | SYSTOLIC BLOOD PRESSURE: 130 MMHG

## 2023-03-22 DIAGNOSIS — E03.9 HYPOTHYROIDISM, UNSPECIFIED TYPE: ICD-10-CM

## 2023-03-22 DIAGNOSIS — Z12.31 ENCOUNTER FOR SCREENING MAMMOGRAM FOR MALIGNANT NEOPLASM OF BREAST: ICD-10-CM

## 2023-03-22 DIAGNOSIS — E11.9 TYPE 2 DIABETES MELLITUS WITHOUT COMPLICATION, WITHOUT LONG-TERM CURRENT USE OF INSULIN (HCC): Primary | ICD-10-CM

## 2023-03-22 DIAGNOSIS — F41.1 GAD (GENERALIZED ANXIETY DISORDER): ICD-10-CM

## 2023-03-22 DIAGNOSIS — E66.09 CLASS 1 OBESITY DUE TO EXCESS CALORIES WITHOUT SERIOUS COMORBIDITY WITH BODY MASS INDEX (BMI) OF 32.0 TO 32.9 IN ADULT: ICD-10-CM

## 2023-03-22 DIAGNOSIS — E78.5 DYSLIPIDEMIA: ICD-10-CM

## 2023-03-22 PROBLEM — E66.811 CLASS 1 OBESITY DUE TO EXCESS CALORIES WITHOUT SERIOUS COMORBIDITY WITH BODY MASS INDEX (BMI) OF 32.0 TO 32.9 IN ADULT: Status: ACTIVE | Noted: 2023-02-27

## 2023-03-22 RX ORDER — ALPRAZOLAM 0.5 MG/1
0.5 TABLET ORAL 2 TIMES DAILY PRN
Qty: 180 TABLET | Refills: 0 | Status: CANCELLED | OUTPATIENT
Start: 2023-03-22 | End: 2023-06-20

## 2023-03-22 RX ORDER — SEMAGLUTIDE 1.34 MG/ML
0.25 INJECTION, SOLUTION SUBCUTANEOUS
COMMUNITY
Start: 2023-03-21

## 2023-03-22 RX ORDER — LEVOTHYROXINE SODIUM 0.12 MG/1
125 TABLET ORAL
COMMUNITY
Start: 2023-02-27

## 2023-03-22 NOTE — TELEPHONE ENCOUNTER
Patient stopped by and has information she would like to review with you. Please let her know a time you will be available to sit down with her to discuss. Follow up to conversation from January.

## 2023-03-22 NOTE — PROGRESS NOTES
Name: Kita Shannon      : [id number]      MRN: [id number]  Encounter Provider: Declan Ulloa DO  Encounter Date: 3/22/2023   Encounter department: [address] [place] [address]     1  Type 2 diabetes mellitus without complication, without long-term current use of insulin ([facility])  Assessment & Plan:    Lab Results   Component Value Date    HGBA1C 5 8 (H) 2023   Most recent hemoglobin A1c 5 8  Per endocrinology patient will start Ozempic near future and follow-up with them for blood sugar/diabetic control  2  Encounter for screening mammogram for malignant neoplasm of breast  -     Mammo screening bilateral w 3d & cad; Future    3  Hypothyroidism, unspecified type  Assessment & Plan:  TSH in January was greater than 15  Levothyroxine was increased from 100 to 125 mcg at that time  4  Class 1 obesity due to excess calories without serious comorbidity with body mass index (BMI) of 32 0 to 32 9 in adult    5  FRAN (generalized anxiety disorder)  Assessment & Plan:  Stable on citalopram and alprazolam       6  Dyslipidemia  Assessment & Plan:  Patient with a prior history of hypertriglyceridemia with triglycerides greater than 600  This occurred after COVID and she was treated for a period of time with fenofibrate  She is now off of medication and triglycerides are satisfactory how ever total cholesterol and LDL are above goal   We will hold off on treatment due to hypothyroidism and treatment for her diabetes which will likely relate and weight loss  Recheck lipid panel 3 to 6 months and determine at that time if patient is a candidate for a statin  Subjective      Patient was seen for follow-up of chronic medical problems  Primary issue is diabetes  Patient has been either diabetic or borderline diabetic by lab result over the last several years  Saw endocrinology recently who started her on Ozempic  She will receive her first dose this week    She is also being followed for hyperlipidemia and hypothyroidism  Review of Systems   Constitutional: Negative  Respiratory: Negative  Cardiovascular: Negative  Gastrointestinal: Negative  Genitourinary: Negative  Musculoskeletal: Negative  Psychiatric/Behavioral: Negative  Current Outpatient Medications on File Prior to Visit   Medication Sig   • ALPRAZolam (XANAX) 0 5 mg tablet Take 1 tablet (0 5 mg total) by mouth 2 (two) times a day as needed for anxiety Do not start before December 22, 2022  • aspirin 81 MG tablet Take 1 tablet by mouth daily   • citalopram (CeleXA) 20 mg tablet Take 1 tablet (20 mg total) by mouth daily   • Butalbital-APAP-Caffeine -40 MG CAPS TAKE 1 CAPSULE BY MOUTH EVERY 8 HOURS AS NEEDED FOR MIGRAINE (Patient not taking: Reported on 3/22/2023)   • ezetimibe (ZETIA) 10 mg tablet Take 1 tablet (10 mg total) by mouth daily (Patient not taking: Reported on 3/22/2023)   • fenofibrate (TRICOR) 54 MG tablet Take 1 tablet (54 mg total) by mouth daily (Patient not taking: Reported on 3/22/2023)   • fluticasone (FLONASE) 50 mcg/act nasal spray  (Patient not taking: Reported on 3/22/2023)   • gabapentin (NEURONTIN) 300 mg capsule TAKE 1 CAPSULE(300 MG) BY MOUTH DAILY AT BEDTIME (Patient not taking: Reported on 3/22/2023)   • levothyroxine 125 mcg tablet 125 mcg   • Ozempic, 0 25 or 0 5 MG/DOSE, 2 MG/1 5ML injection pen 0 25 mg   • SUMAtriptan (IMITREX) 50 mg tablet TAKE 1 TABLET BY MOUTH AT ONSET OF MIGRAINE HEADACHE   MAY REPEAT AFTER 2 HOURS IF HEADACHE IS STILL PERSISTING (Patient not taking: Reported on 3/22/2023)   • [DISCONTINUED] Efinaconazole 10 % SOLN Apply 1 application topically daily (Patient not taking: Reported on 9/23/2022)   • [DISCONTINUED] levothyroxine 100 mcg tablet Take 1 tablet (100 mcg total) by mouth daily (Patient taking differently: Take 125 mcg by mouth daily)   • [DISCONTINUED] metFORMIN (GLUCOPHAGE) 500 mg tablet Take 1 tablet (500 mg total) by mouth daily with breakfast       Objective     /86 (BP Location: Right arm, Patient Position: Sitting, Cuff Size: Large)   Pulse 96   Temp 98 7 °F (37 1 °C)   LMP 03/08/2023 (Approximate)   SpO2 97%     Physical Exam  Vitals and nursing note reviewed  Constitutional:       General: She is not in acute distress  Appearance: She is well-developed  She is not diaphoretic  HENT:      Head: Normocephalic and atraumatic  Eyes:      General:         Right eye: No discharge  Conjunctiva/sclera: Conjunctivae normal       Pupils: Pupils are equal, round, and reactive to light  Neck:      Thyroid: No thyromegaly  Cardiovascular:      Rate and Rhythm: Normal rate and regular rhythm  Pulmonary:      Effort: Pulmonary effort is normal  No respiratory distress  Breath sounds: Normal breath sounds  Musculoskeletal:      Cervical back: Normal range of motion  Lymphadenopathy:      Cervical: No cervical adenopathy  Skin:     General: Skin is warm and dry  Neurological:      Mental Status: She is alert and oriented to person, place, and time  Psychiatric:         Behavior: Behavior normal          Thought Content:  Thought content normal          Judgment: Judgment normal        Josh Do, DO

## 2023-03-22 NOTE — ASSESSMENT & PLAN NOTE
Patient with a prior history of hypertriglyceridemia with triglycerides greater than 600  This occurred after COVID and she was treated for a period of time with fenofibrate  She is now off of medication and triglycerides are satisfactory how ever total cholesterol and LDL are above goal   We will hold off on treatment due to hypothyroidism and treatment for her diabetes which will likely relate and weight loss  Recheck lipid panel 3 to 6 months and determine at that time if patient is a candidate for a statin

## 2023-03-22 NOTE — ASSESSMENT & PLAN NOTE
Lab Results   Component Value Date    HGBA1C 5 8 (H) 01/11/2023   Most recent hemoglobin A1c 5 8  Per endocrinology patient will start Ozempic near future and follow-up with them for blood sugar/diabetic control

## 2023-03-23 ENCOUNTER — TELEPHONE (OUTPATIENT)
Dept: FAMILY MEDICINE CLINIC | Facility: CLINIC | Age: 48
End: 2023-03-23

## 2023-03-23 DIAGNOSIS — F41.1 GAD (GENERALIZED ANXIETY DISORDER): ICD-10-CM

## 2023-03-23 PROBLEM — E66.09 CLASS 1 OBESITY DUE TO EXCESS CALORIES WITHOUT SERIOUS COMORBIDITY WITH BODY MASS INDEX (BMI) OF 32.0 TO 32.9 IN ADULT: Status: RESOLVED | Noted: 2023-02-27 | Resolved: 2023-03-23

## 2023-03-23 PROBLEM — E66.811 CLASS 1 OBESITY DUE TO EXCESS CALORIES WITHOUT SERIOUS COMORBIDITY WITH BODY MASS INDEX (BMI) OF 32.0 TO 32.9 IN ADULT: Status: RESOLVED | Noted: 2023-02-27 | Resolved: 2023-03-23

## 2023-03-23 PROBLEM — E66.9 OBESITY (BMI 30.0-34.9): Status: ACTIVE | Noted: 2023-03-23

## 2023-03-23 PROBLEM — E66.811 OBESITY (BMI 30.0-34.9): Status: ACTIVE | Noted: 2023-03-23

## 2023-03-24 RX ORDER — ALPRAZOLAM 0.5 MG/1
TABLET ORAL
Qty: 180 TABLET | Refills: 1 | Status: SHIPPED | OUTPATIENT
Start: 2023-03-24

## 2023-06-12 DIAGNOSIS — G89.29 CHRONIC RIGHT-SIDED THORACIC BACK PAIN: ICD-10-CM

## 2023-06-12 DIAGNOSIS — M54.2 CERVICALGIA: ICD-10-CM

## 2023-06-12 DIAGNOSIS — M54.6 CHRONIC RIGHT-SIDED THORACIC BACK PAIN: ICD-10-CM

## 2023-06-12 RX ORDER — GABAPENTIN 300 MG/1
CAPSULE ORAL
Qty: 90 CAPSULE | Refills: 1 | Status: SHIPPED | OUTPATIENT
Start: 2023-06-12

## 2023-06-22 ENCOUNTER — VBI (OUTPATIENT)
Dept: ADMINISTRATIVE | Facility: OTHER | Age: 48
End: 2023-06-22

## 2023-08-08 DIAGNOSIS — E03.9 HYPOTHYROIDISM, UNSPECIFIED TYPE: ICD-10-CM

## 2023-08-08 DIAGNOSIS — R13.13 PHARYNGEAL DYSPHAGIA: Primary | ICD-10-CM

## 2023-08-08 DIAGNOSIS — R73.09 ELEVATED GLUCOSE: ICD-10-CM

## 2023-08-08 DIAGNOSIS — E78.5 DYSLIPIDEMIA: ICD-10-CM

## 2023-08-08 DIAGNOSIS — D50.9 IRON DEFICIENCY ANEMIA, UNSPECIFIED IRON DEFICIENCY ANEMIA TYPE: ICD-10-CM

## 2023-08-23 LAB — HBA1C MFR BLD HPLC: 5.4 %

## 2023-09-25 ENCOUNTER — OFFICE VISIT (OUTPATIENT)
Dept: FAMILY MEDICINE CLINIC | Facility: CLINIC | Age: 48
End: 2023-09-25
Payer: COMMERCIAL

## 2023-09-25 VITALS
HEART RATE: 133 BPM | DIASTOLIC BLOOD PRESSURE: 80 MMHG | OXYGEN SATURATION: 97 % | SYSTOLIC BLOOD PRESSURE: 98 MMHG | HEIGHT: 67 IN | TEMPERATURE: 98.5 F | WEIGHT: 177.8 LBS | BODY MASS INDEX: 27.91 KG/M2

## 2023-09-25 DIAGNOSIS — E11.9 TYPE 2 DIABETES MELLITUS WITHOUT COMPLICATION, WITHOUT LONG-TERM CURRENT USE OF INSULIN (HCC): ICD-10-CM

## 2023-09-25 DIAGNOSIS — R73.09 ELEVATED GLUCOSE: ICD-10-CM

## 2023-09-25 DIAGNOSIS — E78.5 DYSLIPIDEMIA: ICD-10-CM

## 2023-09-25 DIAGNOSIS — E03.9 HYPOTHYROIDISM, UNSPECIFIED TYPE: Primary | ICD-10-CM

## 2023-09-25 DIAGNOSIS — F41.1 GAD (GENERALIZED ANXIETY DISORDER): ICD-10-CM

## 2023-09-25 LAB
LEFT EYE DIABETIC RETINOPATHY: NORMAL
LEFT EYE IMAGE QUALITY: NORMAL
LEFT EYE MACULAR EDEMA: NORMAL
LEFT EYE OTHER RETINOPATHY: NORMAL
RIGHT EYE DIABETIC RETINOPATHY: NORMAL
RIGHT EYE IMAGE QUALITY: NORMAL
RIGHT EYE MACULAR EDEMA: NORMAL
RIGHT EYE OTHER RETINOPATHY: NORMAL
SEVERITY (EYE EXAM): NORMAL

## 2023-09-25 PROCEDURE — 99214 OFFICE O/P EST MOD 30 MIN: CPT | Performed by: FAMILY MEDICINE

## 2023-09-25 PROCEDURE — 92250 FUNDUS PHOTOGRAPHY W/I&R: CPT | Performed by: FAMILY MEDICINE

## 2023-09-25 RX ORDER — ALPRAZOLAM 0.5 MG/1
0.5 TABLET ORAL 2 TIMES DAILY PRN
Qty: 180 TABLET | Refills: 1 | Status: SHIPPED | OUTPATIENT
Start: 2023-09-25

## 2023-09-25 RX ORDER — BUPROPION HYDROCHLORIDE 150 MG/1
150 TABLET ORAL EVERY MORNING
Qty: 30 TABLET | Refills: 5 | Status: SHIPPED | OUTPATIENT
Start: 2023-09-25 | End: 2024-03-23

## 2023-09-25 RX ORDER — LEVOTHYROXINE SODIUM 137 UG/1
TABLET ORAL
COMMUNITY
Start: 2023-09-02

## 2023-09-25 NOTE — ASSESSMENT & PLAN NOTE
Lab Results   Component Value Date    HGBA1C 5.4 08/23/2023   Much improved on Ozempic with approximately 40 pounds weight loss. Hemoglobin A1c down to 5.4. Continue current treatment.

## 2023-09-25 NOTE — ASSESSMENT & PLAN NOTE
TSH managed by her endocrinologist.  Dosage of levothyroxine recently increased from 125 to 137 mcg daily.

## 2023-09-25 NOTE — PROGRESS NOTES
Name: Martín Ackerman      : [birth year]      MRN: [id number]  Encounter Provider: Onelia Saini DO  Encounter Date: 2023   Encounter department: [address] [place] [address]     1. Hypothyroidism, unspecified type  Assessment & Plan:  TSH managed by her endocrinologist.  Dosage of levothyroxine recently increased from 125 to 137 mcg daily. Orders:  -     TSH, 3rd generation; Future; Expected date: 2024    2. Elevated glucose  -     Hemoglobin A1C; Future; Expected date: 2024    3. Dyslipidemia  -     Comprehensive metabolic panel; Future; Expected date: 2024  -     Lipid Panel with Direct LDL reflex; Future; Expected date: 2024    4. Type 2 diabetes mellitus without complication, without long-term current use of insulin ([facility])  Assessment & Plan:    Lab Results   Component Value Date    HGBA1C 5.4 2023   Much improved on Ozempic with approximately 40 pounds weight loss. Hemoglobin A1c down to 5.4. Continue current treatment. Orders:  -     IRIS Diabetic eye exam  -     Albumin / creatinine urine ratio; Future    5. FRAN (generalized anxiety disorder)  Assessment & Plan:  Continue Xanax. We will discontinue citalopram and start Wellbutrin  mg daily. Orders:  -     buPROPion (WELLBUTRIN XL) 150 mg 24 hr tablet; Take 1 tablet (150 mg total) by mouth every morning  -     ALPRAZolam (XANAX) 0.5 mg tablet; Take 1 tablet (0.5 mg total) by mouth 2 (two) times a day as needed for anxiety           Subjective      Patient presents to follow-up on her chronic medical problems. She is being treated for hypothyroidism. She is also being treated for diabetes with Ozempic. She has anxiety for which she takes Xanax and citalopram though she feels that citalopram has not been helpful. Review of Systems   Constitutional: Negative. Respiratory: Negative. Cardiovascular: Negative. Gastrointestinal: Negative. Genitourinary: Negative. Musculoskeletal: Negative. Psychiatric/Behavioral: The patient is nervous/anxious. Current Outpatient Medications on File Prior to Visit   Medication Sig   • aspirin 81 MG tablet Take 1 tablet by mouth daily   • levothyroxine 137 mcg tablet    • Ozempic, 0.25 or 0.5 MG/DOSE, 2 MG/1.5ML injection pen 0.25 mg   • [DISCONTINUED] ALPRAZolam (XANAX) 0.5 mg tablet TAKE 1 TABLET(0.5 MG) BY MOUTH TWICE DAILY AS NEEDED FOR ANXIETY. DO NOT. START BEFORE DECEMBER 22, 2022   • [DISCONTINUED] citalopram (CeleXA) 20 mg tablet Take 1 tablet (20 mg total) by mouth daily   • [DISCONTINUED] Butalbital-APAP-Caffeine -40 MG CAPS TAKE 1 CAPSULE BY MOUTH EVERY 8 HOURS AS NEEDED FOR MIGRAINE (Patient not taking: Reported on 3/22/2023)   • [DISCONTINUED] ezetimibe (ZETIA) 10 mg tablet Take 1 tablet (10 mg total) by mouth daily (Patient not taking: Reported on 3/22/2023)   • [DISCONTINUED] fenofibrate (TRICOR) 54 MG tablet Take 1 tablet (54 mg total) by mouth daily (Patient not taking: Reported on 3/22/2023)   • [DISCONTINUED] fluticasone (FLONASE) 50 mcg/act nasal spray  (Patient not taking: Reported on 3/22/2023)   • [DISCONTINUED] gabapentin (NEURONTIN) 300 mg capsule TAKE 1 CAPSULE(300 MG) BY MOUTH DAILY AT BEDTIME   • [DISCONTINUED] levothyroxine 125 mcg tablet 125 mcg   • [DISCONTINUED] SUMAtriptan (IMITREX) 50 mg tablet TAKE 1 TABLET BY MOUTH AT ONSET OF MIGRAINE HEADACHE. MAY REPEAT AFTER 2 HOURS IF HEADACHE IS STILL PERSISTING (Patient not taking: Reported on 3/22/2023)       Objective     BP 98/80 (BP Location: Right arm, Patient Position: Sitting, Cuff Size: Adult)   Pulse (!) 133   Temp 98.5 °F (36.9 °C)   Ht 5' 6.75" (1.695 m)   Wt 80.6 kg (177 lb 12.8 oz)   LMP 09/24/2023 (Exact Date)   SpO2 97%   BMI 28.06 kg/m²     Physical Exam  Vitals and nursing note reviewed. Constitutional:       General: She is not in acute distress. Appearance: She is well-developed. She is not diaphoretic.    HENT: Head: Normocephalic and atraumatic. Eyes:      General:         Right eye: No discharge. Conjunctiva/sclera: Conjunctivae normal.      Pupils: Pupils are equal, round, and reactive to light. Neck:      Thyroid: No thyromegaly. Cardiovascular:      Rate and Rhythm: Normal rate and regular rhythm. Pulmonary:      Effort: Pulmonary effort is normal. No respiratory distress. Breath sounds: Normal breath sounds. Musculoskeletal:      Cervical back: Normal range of motion. Lymphadenopathy:      Cervical: No cervical adenopathy. Skin:     General: Skin is warm and dry. Neurological:      Mental Status: She is alert and oriented to person, place, and time. Psychiatric:         Behavior: Behavior normal.         Thought Content:  Thought content normal.         Judgment: Judgment normal.       Wilmer Taylor, DO

## 2023-11-06 DIAGNOSIS — F41.9 ANXIETY: Primary | ICD-10-CM

## 2023-11-06 RX ORDER — CITALOPRAM 20 MG/1
20 TABLET ORAL DAILY
Qty: 90 TABLET | Refills: 1 | Status: SHIPPED | OUTPATIENT
Start: 2023-11-06

## 2023-11-27 ENCOUNTER — VBI (OUTPATIENT)
Dept: ADMINISTRATIVE | Facility: OTHER | Age: 48
End: 2023-11-27

## 2023-12-22 ENCOUNTER — NURSE TRIAGE (OUTPATIENT)
Dept: OTHER | Facility: OTHER | Age: 48
End: 2023-12-22

## 2023-12-22 ENCOUNTER — HOSPITAL ENCOUNTER (OUTPATIENT)
Dept: ULTRASOUND IMAGING | Facility: HOSPITAL | Age: 48
End: 2023-12-22
Payer: COMMERCIAL

## 2023-12-22 ENCOUNTER — TELEPHONE (OUTPATIENT)
Dept: FAMILY MEDICINE CLINIC | Facility: CLINIC | Age: 48
End: 2023-12-22

## 2023-12-22 DIAGNOSIS — R93.5 ABNORMAL CT SCAN, PELVIS: Primary | ICD-10-CM

## 2023-12-22 DIAGNOSIS — N85.8 UTERINE MASS: ICD-10-CM

## 2023-12-22 DIAGNOSIS — R93.5 ABNORMAL CT SCAN, PELVIS: ICD-10-CM

## 2023-12-22 PROCEDURE — 76856 US EXAM PELVIC COMPLETE: CPT

## 2023-12-22 NOTE — TELEPHONE ENCOUNTER
Patient requesting the order for her Ultrasound be for a Stat. Patient requesting if we can schedule the test for her on [facility]. They let her know they can see her today.

## 2023-12-22 NOTE — TELEPHONE ENCOUNTER
CT scan of the abdomen was normal but CT of the pelvis did show abnormalities in the uterus.  Therefore the best test is a scan/ultrasound of the pelvis.  This has been ordered.  Can be done at [facility] however since CT scan was done
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
## 2023-12-26 ENCOUNTER — OFFICE VISIT (OUTPATIENT)
Dept: FAMILY MEDICINE CLINIC | Facility: CLINIC | Age: 48
End: 2023-12-26
Payer: COMMERCIAL

## 2023-12-26 VITALS
WEIGHT: 170 LBS | SYSTOLIC BLOOD PRESSURE: 116 MMHG | DIASTOLIC BLOOD PRESSURE: 78 MMHG | HEART RATE: 78 BPM | BODY MASS INDEX: 26.68 KG/M2 | TEMPERATURE: 98.2 F | HEIGHT: 67 IN | OXYGEN SATURATION: 97 %

## 2023-12-26 DIAGNOSIS — M25.512 ACUTE PAIN OF LEFT SHOULDER: Primary | ICD-10-CM

## 2023-12-26 DIAGNOSIS — M79.10 MYALGIA: ICD-10-CM

## 2023-12-26 DIAGNOSIS — S06.0X0D CONCUSSION WITHOUT LOSS OF CONSCIOUSNESS, SUBSEQUENT ENCOUNTER: ICD-10-CM

## 2023-12-26 PROCEDURE — 99214 OFFICE O/P EST MOD 30 MIN: CPT | Performed by: FAMILY MEDICINE

## 2023-12-26 RX ORDER — METHOCARBAMOL 750 MG/1
750 TABLET, FILM COATED ORAL 4 TIMES DAILY PRN
COMMUNITY
Start: 2023-12-21 | End: 2023-12-31

## 2023-12-26 RX ORDER — OXYCODONE HYDROCHLORIDE AND ACETAMINOPHEN 5; 325 MG/1; MG/1
1 TABLET ORAL EVERY 4 HOURS PRN
Qty: 10 TABLET | Refills: 0 | Status: SHIPPED | OUTPATIENT
Start: 2023-12-26

## 2023-12-26 RX ORDER — SEMAGLUTIDE 2.68 MG/ML
INJECTION, SOLUTION SUBCUTANEOUS
COMMUNITY
Start: 2023-11-04

## 2023-12-26 RX ORDER — PREDNISONE 10 MG/1
TABLET ORAL
Qty: 21 TABLET | Refills: 0 | Status: SHIPPED | OUTPATIENT
Start: 2023-12-26
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
## 2024-01-03 DIAGNOSIS — M54.50 ACUTE LOW BACK PAIN, UNSPECIFIED BACK PAIN LATERALITY, UNSPECIFIED WHETHER SCIATICA PRESENT: ICD-10-CM

## 2024-01-03 DIAGNOSIS — M54.2 NECK PAIN: ICD-10-CM

## 2024-01-03 DIAGNOSIS — M25.512 ACUTE PAIN OF LEFT SHOULDER: Primary | ICD-10-CM

## 2024-01-05 ENCOUNTER — EVALUATION (OUTPATIENT)
Dept: PHYSICAL THERAPY | Facility: CLINIC | Age: 49
End: 2024-01-05
Payer: COMMERCIAL

## 2024-01-05 DIAGNOSIS — M25.512 ACUTE PAIN OF LEFT SHOULDER: ICD-10-CM

## 2024-01-05 DIAGNOSIS — M54.50 ACUTE LOW BACK PAIN, UNSPECIFIED BACK PAIN LATERALITY, UNSPECIFIED WHETHER SCIATICA PRESENT: ICD-10-CM

## 2024-01-05 DIAGNOSIS — M54.2 NECK PAIN: Primary | ICD-10-CM

## 2024-01-05 PROCEDURE — 97162 PT EVAL MOD COMPLEX 30 MIN: CPT | Performed by: PHYSICAL THERAPIST

## 2024-01-05 PROCEDURE — 97110 THERAPEUTIC EXERCISES: CPT | Performed by: PHYSICAL THERAPIST
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
## 2024-01-08 ENCOUNTER — APPOINTMENT (OUTPATIENT)
Dept: PHYSICAL THERAPY | Facility: CLINIC | Age: 49
End: 2024-01-08
Payer: COMMERCIAL

## 2024-01-10 ENCOUNTER — APPOINTMENT (OUTPATIENT)
Dept: PHYSICAL THERAPY | Facility: CLINIC | Age: 49
End: 2024-01-10
Payer: COMMERCIAL

## 2024-01-11 ENCOUNTER — OFFICE VISIT (OUTPATIENT)
Dept: PHYSICAL THERAPY | Facility: CLINIC | Age: 49
End: 2024-01-11
Payer: COMMERCIAL

## 2024-01-11 DIAGNOSIS — M25.512 ACUTE PAIN OF LEFT SHOULDER: ICD-10-CM

## 2024-01-11 DIAGNOSIS — M54.2 NECK PAIN: ICD-10-CM

## 2024-01-11 DIAGNOSIS — M54.50 ACUTE LOW BACK PAIN, UNSPECIFIED BACK PAIN LATERALITY, UNSPECIFIED WHETHER SCIATICA PRESENT: Primary | ICD-10-CM

## 2024-01-11 PROCEDURE — 97112 NEUROMUSCULAR REEDUCATION: CPT | Performed by: PHYSICAL THERAPIST

## 2024-01-11 PROCEDURE — 97110 THERAPEUTIC EXERCISES: CPT | Performed by: PHYSICAL THERAPIST

## 2024-01-11 PROCEDURE — 97140 MANUAL THERAPY 1/> REGIONS: CPT | Performed by: PHYSICAL THERAPIST

## 2024-01-11 NOTE — PROGRESS NOTES
Daily Note     Today's date: 2024  Patient name: Obdulia Dawson  : 1975  MRN: [id number]  Referring provider: Declan Eli DO  Dx:   Encounter Diagnosis     ICD-10-CM    1. Acute low back pain, unspecified back pain laterality, unspecified whether sciatica present  M54.50       2. Acute pain of left shoulder  M25.512       3. Neck pain  M54.2                      Subjective: Patient reports that she had two outings since the IE to basketball games for her kids. She states that she had difficulty with the lights and sounds of the games, as well as sitting on the hard bleachers. She notes being nauseated during the games. Last night she had trouble sleeping following attending her kid's basketball game. Patient states that overall over the last week, she has been sleeping better, which she contributes to starting gabapentin before bed. She reports that her exercises went well, and feels that she can move her neck a little better.       Objective: See treatment diary below. Lumbar assessment  Lumbar AROM:  Flexion: 50% limited, with most motion coming from hip flexion/thoracic flexion rather than lumbar flexion. Patient notes pain across low back with movement.  Extension: 50% limited, Patient notes pinching pain and R low back/SIJ region.   Rotation: 50% limited, pain with movement    (+) nerve tension with SLR and slump test  (+) ZOHAIB/FADIR for posterolateral hip pain    Hip PROM- B/L  Flexion: 80 deg  ER: 30 deg  IR: 10 deg   Comments: Patient reported back and posterolateral hip pain with movement.       Assessment: Focused session on evaluation of LBP and leg nerve pain. Upon evaluation, patient demonstrates decreased lumbar ROM secondary to pain, decreased PROM secondary to pain and muscle guarding, sciatic nerve tension, and difficulty with functional movement. Symptoms consistent with WAD and sciatic nerve irritation. Reviewed lumbar mobility and nerve glide exercises for home. Continue to  "progress as tolerated. Patient will continue to benefit from skilled PT in order to address impairments and improve function.         Plan: Continue per plan of care.  Progress treatment as tolerated.       Precautions: DM2      Manuals 1/5 1/11           Cervical ROM- as tolerated             Cervical STM- as tolerated                          Lumbar assessment nv JF           Nerve assessment UE/LE nv JF                        Neuro Re-Ed             Scap retraction HEP                                      PNE 30 min                                                   Ther Ex             Supine cervical rotation AROM HEP            Supine cervical flex/ext ROM HEP            Thoracic extension HEP            LTR  10x10\"           SKTC  10x10\"           Seated t/s ext  10x10\"           Seated t/s rotation  10x10\"                        Ther Activity                                       Gait Training                                       Modalities             heat  10 min post                                     IE/HEP JF Lumbar assessment                "

## 2024-01-12 ENCOUNTER — OFFICE VISIT (OUTPATIENT)
Dept: PHYSICAL THERAPY | Facility: CLINIC | Age: 49
End: 2024-01-12
Payer: COMMERCIAL

## 2024-01-12 DIAGNOSIS — M54.2 CERVICALGIA: ICD-10-CM

## 2024-01-12 DIAGNOSIS — M54.50 ACUTE LOW BACK PAIN, UNSPECIFIED BACK PAIN LATERALITY, UNSPECIFIED WHETHER SCIATICA PRESENT: Primary | ICD-10-CM

## 2024-01-12 DIAGNOSIS — M25.512 ACUTE PAIN OF LEFT SHOULDER: Primary | ICD-10-CM

## 2024-01-12 DIAGNOSIS — M54.50 ACUTE LOW BACK PAIN, UNSPECIFIED BACK PAIN LATERALITY, UNSPECIFIED WHETHER SCIATICA PRESENT: ICD-10-CM

## 2024-01-12 DIAGNOSIS — M54.2 NECK PAIN: ICD-10-CM

## 2024-01-12 PROCEDURE — 97112 NEUROMUSCULAR REEDUCATION: CPT | Performed by: PHYSICAL THERAPIST

## 2024-01-12 PROCEDURE — 97110 THERAPEUTIC EXERCISES: CPT | Performed by: PHYSICAL THERAPIST

## 2024-01-12 RX ORDER — GABAPENTIN 300 MG/1
300 CAPSULE ORAL
Qty: 30 CAPSULE | Refills: 2 | Status: SHIPPED | OUTPATIENT
Start: 2024-01-12 | End: 2024-04-11

## 2024-01-12 NOTE — PROGRESS NOTES
"Daily Note     Today's date: 2024  Patient name: Obdulia Dawson  : 1975  MRN: [id number]  Referring provider: Declan Eli DO  Dx:   Encounter Diagnosis     ICD-10-CM    1. Acute pain of left shoulder  M25.512       2. Neck pain  M54.2       3. Acute low back pain, unspecified back pain laterality, unspecified whether sciatica present  M54.50                      Subjective: Patient reports that she is sore and tired today. She notes fatigue after yesterday's session, however she wasn't able to sleep last night because of a HA. She states that her HA is frontal and behind her eyes.       Objective: See treatment diary below.       Assessment: No change with regards to her HA with cervical stretching. Patient demonstrates improving cervical and thoracic ROM. Patient remains limited with soft tissue restrictions, muscle guarding, and pain. Continue to progress as tolerated. Patient will continue to benefit from skilled PT in order to address impairments and improve function.         Plan: Continue per plan of care.  Progress treatment as tolerated.       Precautions: DM2      Manuals           Cervical ROM- as tolerated             Cervical STM- as tolerated                          Lumbar assessment nv JF           Nerve assessment UE/LE nv JF                        Neuro Re-Ed             Scap retraction HEP  5\"  2x10                                    PNE 30 min                                                   Ther Ex             Supine cervical rotation AROM HEP            Supine cervical flex/ext ROM HEP            Thoracic extension HEP            LTR  10x10\"           SKTC  10x10\"           Seated t/s ext  10x10\" 10x10\"          Seated t/s rotation  10x10\" 10x10\"          SNAG   10x10\" ea          Ext SNAG   10x10\"          UT stretch   5x15\"          LS stretch   5x15\"          Ther Activity                                       Gait Training                                   "     Modalities             heat  10 min post 10 min post                                    IE/HEP JF Lumbar assessment

## 2024-01-15 ENCOUNTER — OFFICE VISIT (OUTPATIENT)
Dept: PHYSICAL THERAPY | Facility: CLINIC | Age: 49
End: 2024-01-15
Payer: COMMERCIAL

## 2024-01-15 DIAGNOSIS — M54.50 ACUTE LOW BACK PAIN, UNSPECIFIED BACK PAIN LATERALITY, UNSPECIFIED WHETHER SCIATICA PRESENT: ICD-10-CM

## 2024-01-15 DIAGNOSIS — M54.2 NECK PAIN: Primary | ICD-10-CM

## 2024-01-15 DIAGNOSIS — M25.512 ACUTE PAIN OF LEFT SHOULDER: ICD-10-CM

## 2024-01-15 PROCEDURE — 97112 NEUROMUSCULAR REEDUCATION: CPT | Performed by: PHYSICAL THERAPIST

## 2024-01-15 PROCEDURE — 97110 THERAPEUTIC EXERCISES: CPT | Performed by: PHYSICAL THERAPIST

## 2024-01-15 NOTE — PROGRESS NOTES
"Daily Note     Today's date: 1/15/2024  Patient name: Obdulia Dawson  : 1975  MRN: [id number]  Referring provider: Declan Eli DO  Dx:   Encounter Diagnosis     ICD-10-CM    1. Neck pain  M54.2       2. Acute pain of left shoulder  M25.512       3. Acute low back pain, unspecified back pain laterality, unspecified whether sciatica present  M54.50                    Subjective: Patient reports that her headache has been intermittent over the weekend. No change in intensity of her HA thus far. Sleeping mildly better, but her sleep continues to be interrupted by needing to get up to take tylenol and ibuprofen. Patient states that       Objective: See treatment diary below.       Assessment: Added additional core exercises and nerve mobility exercises this session and they were tolerated well. She continues to describe a \"burning\" sensation with stretching and activation of posterior chain musculature. Patient remains limited with soft tissue restrictions, muscle guarding, and pain. Mobility does appear to be improving slowly. Continue to progress as tolerated. Patient will continue to benefit from skilled PT in order to address impairments and improve function.         Plan: Continue per plan of care.  Progress treatment as tolerated.       Precautions: DM2      Manuals 1/5 1/11 1/12 1/15         Cervical ROM- as tolerated             Cervical STM- as tolerated                          Lumbar assessment nv JF           Nerve assessment UE/LE nv JF                        Neuro Re-Ed             Scap retraction HEP  5\"  2x10 5\"  2x10         Sciatic sequence    10x10\"         Pelvic rocking    x10         Pball push down in supine    5\" hold  x10                                                Ther Ex             Supine cervical rotation AROM HEP            Supine cervical flex/ext ROM HEP            Thoracic extension HEP            LTR  10x10\"  10x10\"         SKTC  10x10\"  10x10\"         Seated t/s ext  " "10x10\" 10x10\" 10x10\"         Seated t/s rotation  10x10\" 10x10\" 10x10\"         SNAG   10x10\" ea 10x10\"         Ext SNAG   10x10\" 10x10\"         UT stretch   5x15\" 5x15\"         LS stretch   5x15\" 5x15\"         Ther Activity                                       Gait Training                                       Modalities             heat  10 min post 10 min post def                                   IE/HEP JF Lumbar assessment                "

## 2024-01-17 ENCOUNTER — OFFICE VISIT (OUTPATIENT)
Dept: PHYSICAL THERAPY | Facility: CLINIC | Age: 49
End: 2024-01-17
Payer: COMMERCIAL

## 2024-01-17 DIAGNOSIS — M54.50 ACUTE LOW BACK PAIN, UNSPECIFIED BACK PAIN LATERALITY, UNSPECIFIED WHETHER SCIATICA PRESENT: ICD-10-CM

## 2024-01-17 DIAGNOSIS — M25.512 ACUTE PAIN OF LEFT SHOULDER: Primary | ICD-10-CM

## 2024-01-17 DIAGNOSIS — M54.2 NECK PAIN: ICD-10-CM

## 2024-01-17 PROCEDURE — 97110 THERAPEUTIC EXERCISES: CPT | Performed by: PHYSICAL THERAPIST

## 2024-01-17 NOTE — PROGRESS NOTES
"Daily Note     Today's date: 2024  Patient name: Obdulia Dawson  : 1975  MRN: [id number]  Referring provider: Declan Eli DO  Dx:   Encounter Diagnosis     ICD-10-CM    1. Acute pain of left shoulder  M25.512       2. Neck pain  M54.2       3. Acute low back pain, unspecified back pain laterality, unspecified whether sciatica present  M54.50                    Subjective: Patient reports that her headache continues to be as bad. She notes that after therapy sessions her head feels worse, with increased fatigue and irritability. She notes that the light sensitivity she is experiencing is just as bad as well.       Objective: See treatment diary below.       Assessment: Had a long discussion with Obdulia regarding her concussion symptoms, and that she may benefit from concussion therapy at this time to address her symptoms of HA, light sensitivity, and fatigue with activity. I believe that these symptoms are negatively affecting her ability to participate in cervical and low back rehab at this time. I have contacted the [place] location regarding a concussion therapy evaluation, and she is set up to be evaluated on . Will hold therapy for the neck and back at this time.         Plan: Hold neck and back POC until concussion evaluation on 24     Precautions: DM2      Manuals 1/5 1/11 1/12 1/15 1/17        Cervical ROM- as tolerated             Cervical STM- as tolerated                          Lumbar assessment nv JF           Nerve assessment UE/LE nv JF                        Neuro Re-Ed             Scap retraction HEP  5\"  2x10 5\"  2x10         Sciatic sequence    10x10\"         Pelvic rocking    x10         Pball push down in supine    5\" hold  x10                                                Ther Ex             Supine cervical rotation AROM HEP            Supine cervical flex/ext ROM HEP            Thoracic extension HEP            LTR  10x10\"  10x10\"         SKTC  10x10\"  10x10\"    " "     Seated t/s ext  10x10\" 10x10\" 10x10\"         Seated t/s rotation  10x10\" 10x10\" 10x10\"         SNAG   10x10\" ea 10x10\"         Ext SNAG   10x10\" 10x10\"         UT stretch   5x15\" 5x15\" 5x15\"        LS stretch   5x15\" 5x15\" 5x15\"        Ther Activity                                       Gait Training                                       Modalities             heat  10 min post 10 min post def def                     Education     JF  10 min        IE/HEP JF Lumbar assessment                "

## 2024-01-19 ENCOUNTER — APPOINTMENT (OUTPATIENT)
Dept: PHYSICAL THERAPY | Facility: CLINIC | Age: 49
End: 2024-01-19
Payer: COMMERCIAL

## 2024-01-23 ENCOUNTER — EVALUATION (OUTPATIENT)
Facility: REHABILITATION | Age: 49
End: 2024-01-23
Payer: COMMERCIAL

## 2024-01-23 DIAGNOSIS — S06.0X0D CONCUSSION WITHOUT LOSS OF CONSCIOUSNESS, SUBSEQUENT ENCOUNTER: Primary | ICD-10-CM

## 2024-01-23 DIAGNOSIS — M54.2 NECK PAIN: ICD-10-CM

## 2024-01-23 DIAGNOSIS — G44.319 ACUTE POST-TRAUMATIC HEADACHE, NOT INTRACTABLE: ICD-10-CM

## 2024-01-23 DIAGNOSIS — R26.9 GAIT DISTURBANCE: ICD-10-CM

## 2024-01-23 PROCEDURE — 97112 NEUROMUSCULAR REEDUCATION: CPT | Performed by: PHYSICAL THERAPIST

## 2024-01-23 PROCEDURE — 97164 PT RE-EVAL EST PLAN CARE: CPT | Performed by: PHYSICAL THERAPIST
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
## 2024-01-26 ENCOUNTER — OFFICE VISIT (OUTPATIENT)
Facility: REHABILITATION | Age: 49
End: 2024-01-26
Payer: COMMERCIAL

## 2024-01-26 DIAGNOSIS — R26.9 GAIT DISTURBANCE: ICD-10-CM

## 2024-01-26 DIAGNOSIS — G44.319 ACUTE POST-TRAUMATIC HEADACHE, NOT INTRACTABLE: ICD-10-CM

## 2024-01-26 DIAGNOSIS — S06.0X0D CONCUSSION WITHOUT LOSS OF CONSCIOUSNESS, SUBSEQUENT ENCOUNTER: Primary | ICD-10-CM

## 2024-01-26 DIAGNOSIS — M54.2 NECK PAIN: ICD-10-CM

## 2024-01-26 PROCEDURE — 97112 NEUROMUSCULAR REEDUCATION: CPT | Performed by: PHYSICAL THERAPIST

## 2024-01-26 PROCEDURE — 97110 THERAPEUTIC EXERCISES: CPT | Performed by: PHYSICAL THERAPIST

## 2024-01-26 PROCEDURE — 97140 MANUAL THERAPY 1/> REGIONS: CPT | Performed by: PHYSICAL THERAPIST
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
## 2024-01-29 ENCOUNTER — OFFICE VISIT (OUTPATIENT)
Facility: REHABILITATION | Age: 49
End: 2024-01-29
Payer: COMMERCIAL

## 2024-01-29 DIAGNOSIS — G44.319 ACUTE POST-TRAUMATIC HEADACHE, NOT INTRACTABLE: ICD-10-CM

## 2024-01-29 DIAGNOSIS — M54.50 ACUTE LOW BACK PAIN, UNSPECIFIED BACK PAIN LATERALITY, UNSPECIFIED WHETHER SCIATICA PRESENT: ICD-10-CM

## 2024-01-29 DIAGNOSIS — S06.0X0D CONCUSSION WITHOUT LOSS OF CONSCIOUSNESS, SUBSEQUENT ENCOUNTER: Primary | ICD-10-CM

## 2024-01-29 DIAGNOSIS — M54.2 NECK PAIN: ICD-10-CM

## 2024-01-29 DIAGNOSIS — M25.512 ACUTE PAIN OF LEFT SHOULDER: ICD-10-CM

## 2024-01-29 PROCEDURE — 97110 THERAPEUTIC EXERCISES: CPT | Performed by: PHYSICAL THERAPIST

## 2024-01-29 PROCEDURE — 97150 GROUP THERAPEUTIC PROCEDURES: CPT | Performed by: PHYSICAL THERAPIST

## 2024-01-29 PROCEDURE — 97140 MANUAL THERAPY 1/> REGIONS: CPT | Performed by: PHYSICAL THERAPIST

## 2024-01-29 PROCEDURE — 97112 NEUROMUSCULAR REEDUCATION: CPT | Performed by: PHYSICAL THERAPIST

## 2024-01-29 NOTE — PROGRESS NOTES
Daily Note    Name: Obdulia Dawson  Date: 24  : 1975  Referring Provider: Declan Eli DO  AUTHORIZATION:   Insurance: Payor: [employer] / Plan: [employer] / Product Type: Automobile /   2 of 16 visits through 3/19 PN due     SUBJECTIVE:  HPI: Obdulia Dawson is a 48 y.o. female referred to outpatient physical therapy for the following diagnosis   Encounter Diagnoses   Name Primary?    Concussion without loss of consciousness, subsequent encounter Yes    Neck pain     Acute post-traumatic headache, not intractable     Acute low back pain, unspecified back pain laterality, unspecified whether sciatica present     Acute pain of left shoulder              Subjective: I passes out today. Report feeling tunneling vision, was able to take herself out of the shower and lay down, call  for help with the phone, layed on the floor for a couple of minutes before he  came to  help her and give her water. She is concerned about  return to travel for work due to limited family support system.    OBJECTIVE: /78  Pain Assessment   .24     Headache Frequency: daily  Duration: 1 hour  Intensity:         Best:4        Worst:> 10        Average: 7  Location: temporal, frontal, feels like my head is being squeezed  Exacerbating Factors: light/sound music/talking  Relieving Factors: pan meds Daily  1 hour before she takes ibuprofein  Intensity:      Best:4      Daily   Best 3/10  Worst; 4/10    Location frontal   Cervical spine 4/10 6/10 6/10 back of the neck     Precautions: prior concussion    Specialty Daily Treatment Diary     Exercise Diary  24   Home exercise program  Developed , gave hard copy and texted program Pateint education in returning to normalized activities. Resting between activities but returning to meaninful daily activities. Scheduled Occupational Therapy for return to computer and contnued Concussion  education   Cervical spine      Exertion 6 MWT Scifit  5 minutes level 1 66 spt    Orthopedic exercises  Chin tucks 10 x :10 Chin tucks 10 x :10  Lateral flexion 5 x :10  Rotation 5 x :10     Oculomotor VOMS Vor x 1  unable to perform standing> 1 set  Hor/ dm  3 x :30 nausea/dizziness 6/10    Saccades  Horizontal  3 x :30  heaviness 7/10  Vertical  3:30  Dizziness 4/10 VOR 3 x :30  Horizontal  Lateral    Saccades  Horizontal  Lateral      Increased dizziness with horizontal movements     Vestibular/balance FGA  MCTSIB Foam FTEO 3 x :30            FTEC  3 x :30  Singe leg stance  2 x :20    Manual   STM  Occipital release  Trigger point release upper trap/ rhob  Gentle distraction  End range stretch STM  Occipital release  Trigger point release upper trap/ rhob  Gentle distraction  End range stretch   Access Code: [email or web address]  URL: [email or web address]  Date: 01/26/2024  Prepared by: Emilia Fierro    Exercises  - Seated Scapular Retraction  - 7 x weekly - 10 reps - 10  hold  - Seated Cervical Retraction  - 7 x weekly - 10 reps - 10 hold  - Seated Cervical Rotation AROM  - 7 x weekly - 2 sets - 10 reps - 10 hold  - Seated Cervical Sidebending AROM  - 7 x weekly - 2 sets - 10 reps - 10 hold  - Seated Gaze Stabilization with Head Nod  - 2 x daily - 7 x weekly - 3 reps - 30 hold  - Seated Gaze Stabilization with Head Rotation  - 2 x daily - 7 x weekly - 3 reps - 30 hold  - Seated Horizontal Saccades  - 2 x daily - 7 x weekly - 3 reps - 30 hold  - Seated Vertical Saccades  - 2 x daily - 7 x weekly - 3 reps - 30 hold  - Seated Diagonal Saccades  - 2 x daily - 7 x weekly - 10 reps - 30 hold           ASSESSMENT:     Home exercise program developed and issued to patient. Obdulia has limited tolerance with VOMs and requires recovery periods. Initiated exertion on scifit, and anticipating moving onto treadmill with head movement progressions as tolerated.  Concerns of hypervigilance symptom following a  concussion and importance of identifying and re-framing stressful situations. Encouraged continued return to  baseline meaningful activities.Responded well to manual therapy intervention with considerable decrease in muscle spasms and pain. She will continue to benefit from skilled PT  PLAN OF CARE:  Will add convergence and progress as tolerated.            Modified Clinical Test of Sensory Interaction on Balance   30 eyes open firm surface   30 eyes closed firm surface   21 eyes open foam surface   16 eyes closed foam surface   *Increased headache to 7/10  Mobility Measures 1.23.24   Assistive device used? None   Walking speed .75 meters/second   Functional Gait Assessment (see below) 22/30   6 Minute Walk Test (100 foot circular course) 1035 feet  Increased headache to 7/10   Patient-Reported Outcome Measure: Patient Specific Functional Scale (PSFS   2.5/40     Concussion Symptom Severity Score    22/22 categories  118/132 total score     1/4/24  VOMS: Not Tested Headache  0-10 Dizziness  0-10 Nausea  0-10 Fogginess  0-10 Comments   Baseline Sx:  6 2 5 9    Smooth Pursuits  7       Saccades- Horizontal  8       Saccades- Vertical  7       Convergence      (Near Point in cm):   Measure 1: __24____   Measure 2:__24____   Measure 3:__22____   VOR - Horizontal   7      VOR - Vertical   7          Emilia Fierro, PT  1/29/2024

## 2024-02-02 ENCOUNTER — EVALUATION (OUTPATIENT)
Facility: REHABILITATION | Age: 49
End: 2024-02-02
Payer: COMMERCIAL

## 2024-02-02 ENCOUNTER — APPOINTMENT (OUTPATIENT)
Dept: OCCUPATIONAL THERAPY | Facility: REHABILITATION | Age: 49
End: 2024-02-02
Payer: COMMERCIAL

## 2024-02-02 DIAGNOSIS — S06.0X0D CONCUSSION WITHOUT LOSS OF CONSCIOUSNESS, SUBSEQUENT ENCOUNTER: Primary | ICD-10-CM

## 2024-02-02 DIAGNOSIS — M54.2 NECK PAIN: ICD-10-CM

## 2024-02-02 PROCEDURE — 97110 THERAPEUTIC EXERCISES: CPT | Performed by: PHYSICAL THERAPIST

## 2024-02-02 PROCEDURE — 97164 PT RE-EVAL EST PLAN CARE: CPT | Performed by: PHYSICAL THERAPIST

## 2024-02-02 PROCEDURE — 97112 NEUROMUSCULAR REEDUCATION: CPT | Performed by: PHYSICAL THERAPIST
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
## 2024-02-05 ENCOUNTER — OFFICE VISIT (OUTPATIENT)
Facility: REHABILITATION | Age: 49
End: 2024-02-05
Payer: COMMERCIAL

## 2024-02-05 DIAGNOSIS — R26.9 GAIT DISTURBANCE: ICD-10-CM

## 2024-02-05 DIAGNOSIS — S06.0X0D CONCUSSION WITHOUT LOSS OF CONSCIOUSNESS, SUBSEQUENT ENCOUNTER: Primary | ICD-10-CM

## 2024-02-05 DIAGNOSIS — G44.319 ACUTE POST-TRAUMATIC HEADACHE, NOT INTRACTABLE: ICD-10-CM

## 2024-02-05 DIAGNOSIS — M54.2 NECK PAIN: ICD-10-CM

## 2024-02-05 PROCEDURE — 97140 MANUAL THERAPY 1/> REGIONS: CPT | Performed by: PHYSICAL THERAPIST

## 2024-02-05 PROCEDURE — 97112 NEUROMUSCULAR REEDUCATION: CPT | Performed by: PHYSICAL THERAPIST

## 2024-02-05 PROCEDURE — 97110 THERAPEUTIC EXERCISES: CPT | Performed by: PHYSICAL THERAPIST

## 2024-02-05 NOTE — PROGRESS NOTES
Daily Note     Today's date: 2024  Patient name: Obdulia Dawson  : 1975  MRN: [id number]  Referring provider: Declan Eli DO  Dx:   Encounter Diagnosis     ICD-10-CM    1. Concussion without loss of consciousness, subsequent encounter  S06.0X0D       2. Neck pain  M54.2       3. Acute post-traumatic headache, not intractable  G44.319       4. Gait disturbance  R26.9                        Subjective: Had her sons basketball game over the weekend. Her  helped her down the bleachers because she was dizzy. After the game went home and rested in a dark room.        Objective: See treatment diary below        Assessment: Tolerated treatment fair. Re-initiated light aerobic training on scifit today. Pt reported some discomfort and pain in the hips/tail bone but able to tolerate. Recommended incorporating aerobic exercise at home in form of walking or stationary bike. Challenged with head turns and oculomotor training needing seated rest breaks through out. Would benefit from continued PT.        Plan: Continue per plan of care.  Address cervical AROM and muscle tenderness. Address oculomotor and VOR function. Address activity tolerance.        Precautions: DM2    Precautions:    Primary impairments:    Exercises given for HEP:   Access Code: [email or web address]  URL: [email or web address]  Date: 2024  Prepared by: Nader Rogel    Program Notes  Perform light aerobic exercise in form of stationary bike or walking 10min a day.     Exercises  - Seated Scapular Retraction  - 7 x weekly - 10 reps - 10  hold  - Seated Cervical Retraction  - 7 x weekly - 10 reps - 10 hold  - Seated Cervical Rotation AROM  - 7 x weekly - 2 sets - 10 reps - 10 hold  - Seated Horizontal Saccades  - 2 x daily - 7 x weekly - 3 reps - 30 hold  - Seated Vertical Saccades  - 2 x daily - 7 x weekly - 3 reps - 30 hold  - Seated Assisted Cervical Rotation with Towel  - 1 x daily - 7 x weekly - 10 reps - 5 sec hold       "     Manual 2/2 2/5     STM  Cervical paraspinals. 7min   IASTM R parapsinals and suboccip 8min     Gentle distraction 3min      Neuro Re-Ed       VORx1 15\" x1  hold     Saccades  15\" x2 ea  15\" x2 ea      HT/HN walking 20ft lap x3 ea 2 laps ea //bars                                                      Education  Intermittent breaks for graded return to activity such as attending sport games Adding light aerobic exercise at home with bike or walking      Ther Ex       Cervical snags X10 5\" ea way       Chin tucks X10 5\" at wall       Upper trap stret  20\"x2 for R/L.      Rows  RTB x10  GTB x10                          Bike/Tread    Scifit L1 ~60spm. 8min       Ther Activity                                       Gait Training                                 Modalities                           "

## 2024-02-06 ENCOUNTER — APPOINTMENT (OUTPATIENT)
Facility: REHABILITATION | Age: 49
End: 2024-02-06
Payer: COMMERCIAL

## 2024-02-08 ENCOUNTER — APPOINTMENT (OUTPATIENT)
Facility: REHABILITATION | Age: 49
End: 2024-02-08
Payer: COMMERCIAL

## 2024-02-13 ENCOUNTER — APPOINTMENT (OUTPATIENT)
Facility: REHABILITATION | Age: 49
End: 2024-02-13
Payer: COMMERCIAL

## 2024-02-15 ENCOUNTER — APPOINTMENT (OUTPATIENT)
Facility: REHABILITATION | Age: 49
End: 2024-02-15
Payer: COMMERCIAL

## 2024-02-15 ENCOUNTER — OFFICE VISIT (OUTPATIENT)
Facility: REHABILITATION | Age: 49
End: 2024-02-15
Payer: COMMERCIAL

## 2024-02-15 DIAGNOSIS — R26.9 GAIT DISTURBANCE: ICD-10-CM

## 2024-02-15 DIAGNOSIS — G44.319 ACUTE POST-TRAUMATIC HEADACHE, NOT INTRACTABLE: ICD-10-CM

## 2024-02-15 DIAGNOSIS — M54.2 NECK PAIN: ICD-10-CM

## 2024-02-15 DIAGNOSIS — S06.0X0D CONCUSSION WITHOUT LOSS OF CONSCIOUSNESS, SUBSEQUENT ENCOUNTER: Primary | ICD-10-CM

## 2024-02-15 PROCEDURE — 97140 MANUAL THERAPY 1/> REGIONS: CPT | Performed by: PHYSICAL THERAPIST

## 2024-02-15 PROCEDURE — 97110 THERAPEUTIC EXERCISES: CPT | Performed by: PHYSICAL THERAPIST

## 2024-02-15 NOTE — PROGRESS NOTES
"Daily Note     Today's date: 2/15/2024  Patient name: Obdulia Dawson  : 1975  MRN: [id number]  Referring provider: Declan Eli DO  Dx:   Encounter Diagnosis     ICD-10-CM    1. Concussion without loss of consciousness, subsequent encounter  S06.0X0D       2. Neck pain  M54.2       3. Acute post-traumatic headache, not intractable  G44.319       4. Gait disturbance  R26.9                          Subjective: Feels more pain the R side of her neck. Reports more difficulty with turning to the R. Still has the issues of a \"tunneling\" feeling when getting up from a seated position which lasts a few seconds. With this also gets pain in the back of her head. Has been doing light walks and using the stationary bike at home which she reports feeling good with.       Objective: See treatment diary below        Assessment: Tolerated treatment fair.  Noted soft tissue tenderness and spasm to right suboccipital region today.  Educated patient on how her posterior head pain may be related to suboccipital neuralgia based on her symptoms.  Although no concerns of any central neurological changes recommended that if she is concerned about her symptoms when rising quickly from a chair she should discuss this with her PCP.  Emphasized upper cervical mobility today and thoracic strengthening to address pain.  Also discussed that her slouching posture in sitting is probably contributing to worsening pain, which we will work on improving gradually.  Would benefit from continued PT.        Plan: Continue per plan of care.  Address cervical AROM and muscle tenderness. Address oculomotor and VOR function. Address activity tolerance.        Precautions: DM2    Precautions:    Primary impairments:    Exercises given for HEP:   Access Code: [email or web address]  URL: [email or web address]  Date: 02/15/2024  Prepared by: Nader Rogel    Program Notes  Perform light aerobic exercise in form of stationary bike or walking 10min " "a day.     Exercises  - Seated Cervical Retraction  - 7 x weekly - 10 reps - 10 hold  - Seated Cervical Rotation AROM  - 7 x weekly - 2 sets - 10 reps - 10 hold  - Seated Horizontal Saccades  - 2 x daily - 7 x weekly - 3 reps - 30 hold  - Seated Vertical Saccades  - 2 x daily - 7 x weekly - 3 reps - 30 hold  - Seated Assisted Cervical Rotation with Towel  - 1 x daily - 7 x weekly - 10 reps - 5 sec hold  - Standing Shoulder Row with Anchored Resistance  - 1 x daily - 7 x weekly - 3 sets - 10 reps  - Shoulder extension with resistance - Neutral  - 1 x daily - 7 x weekly - 3 sets - 10 reps           Manual 2/2 2/5 2/15    STM  Cervical paraspinals. 7min   IASTM R parapsinals and suboccip 8min STM sub occipital (more on R)    STM to R scapula     Gentle distraction 3min      Neuro Re-Ed       VORx1 15\" x1  hold     Saccades  15\" x2 ea  15\" x2 ea      HT/HN walking 20ft lap x3 ea 2 laps ea //bars                                                      Education  Intermittent breaks for graded return to activity such as attending sport games Adding light aerobic exercise at home with bike or walking  -Adding step coutner    -Emphasise upper cervical mobility exercises    -Adding band exercises for home    -Posture education     Ther Ex       Cervical snags X10 5\" ea way  X5 5\" ea way  X5 5\" ea way     Chin tucks X10 5\" at wall  X10 5\" stand X10 5\" stand    Upper trap stret  20\"x2 for R/L.      Rows  RTB x10  GTB x10 GTB x10  BTB x 10    Low Rows    GTB 2x10                  Bike/Tread    Scifit L1 ~60spm. 8min       Ther Activity                                       Gait Training                                 Modalities                           "

## 2024-02-20 ENCOUNTER — OFFICE VISIT (OUTPATIENT)
Facility: REHABILITATION | Age: 49
End: 2024-02-20
Payer: COMMERCIAL

## 2024-02-20 ENCOUNTER — APPOINTMENT (OUTPATIENT)
Facility: REHABILITATION | Age: 49
End: 2024-02-20
Payer: COMMERCIAL

## 2024-02-20 DIAGNOSIS — M54.2 NECK PAIN: ICD-10-CM

## 2024-02-20 DIAGNOSIS — S06.0X0D CONCUSSION WITHOUT LOSS OF CONSCIOUSNESS, SUBSEQUENT ENCOUNTER: Primary | ICD-10-CM

## 2024-02-20 PROCEDURE — 97110 THERAPEUTIC EXERCISES: CPT | Performed by: PHYSICAL THERAPIST

## 2024-02-20 PROCEDURE — 97140 MANUAL THERAPY 1/> REGIONS: CPT | Performed by: PHYSICAL THERAPIST

## 2024-02-20 PROCEDURE — 97112 NEUROMUSCULAR REEDUCATION: CPT | Performed by: PHYSICAL THERAPIST

## 2024-02-20 NOTE — PROGRESS NOTES
"Daily Note     Today's date: 2024  Patient name: Obdulia Dawson  : 1975  MRN: [id number]  Referring provider: Declan Eli DO  Dx:   Encounter Diagnosis     ICD-10-CM    1. Concussion without loss of consciousness, subsequent encounter  S06.0X0D       2. Neck pain  M54.2                            Subjective: Feels more pain the R side of her neck. Reports more difficulty with turning to the R. Still has the issues of a \"tunneling\" feeling when getting up from a seated position which lasts a few seconds. With this also gets pain in the back of her head. Has been doing light walks and using the stationary bike at home which she reports feeling good with.       Objective: See treatment diary below        Assessment: Tolerated treatment fair. Addressed reports of posterior rib pain with thoracic mobs and STM. Notable muscle spasm and tenderness at region or report pain. Some R shoulder pain with thoracic strengthening, but reported being tolerable. Some dizziness and nausea reported with head movements while walking. Pt given self guided rest breaks through out in relation to symptoms and was able to continue with exercises.       Plan: Continue per plan of care.  Address cervical AROM and muscle tenderness. Address oculomotor and VOR function. Address activity tolerance.        Precautions: DM2    Precautions:    Primary impairments:    Exercises given for HEP:   Access Code: [email or web address]  URL: [email or web address]  Date: 02/15/2024  Prepared by: Nader Rogel    Program Notes  Perform light aerobic exercise in form of stationary bike or walking 10min a day.     Exercises  - Seated Cervical Retraction  - 7 x weekly - 10 reps - 10 hold  - Seated Cervical Rotation AROM  - 7 x weekly - 2 sets - 10 reps - 10 hold  - Seated Horizontal Saccades  - 2 x daily - 7 x weekly - 3 reps - 30 hold  - Seated Vertical Saccades  - 2 x daily - 7 x weekly - 3 reps - 30 hold  - Seated Assisted Cervical " "Rotation with Towel  - 1 x daily - 7 x weekly - 10 reps - 5 sec hold  - Standing Shoulder Row with Anchored Resistance  - 1 x daily - 7 x weekly - 3 sets - 10 reps  - Shoulder extension with resistance - Neutral  - 1 x daily - 7 x weekly - 3 sets - 10 reps           Manual 2/2 2/5 2/15 2/20   STM  Cervical paraspinals. 7min   IASTM R parapsinals and suboccip 8min STM sub occipital (more on R)    STM to R scapula  Thoracic P-A mob Gr IV    STM to thoracic paraspinals    Gentle distraction 3min      Neuro Re-Ed       VORx1 15\" x1  hold     Saccades  15\" x2 ea  15\" x2 ea   15\" x2 ea    HT/HN walking 20ft lap x3 ea 2 laps ea //bars  3 laps ea //bars                                                    Education  Intermittent breaks for graded return to activity such as attending sport games Adding light aerobic exercise at home with bike or walking  -Adding step coutner    -Emphasise upper cervical mobility exercises    -Adding band exercises for home    -Posture education     Ther Ex       Cervical snags X10 5\" ea way  X5 5\" ea way  X5 5\" ea way  X10 5\" ea way    Chin tucks X10 5\" at wall  X10 5\" stand X10 5\" stand    Upper trap stret  20\"x2 for R/L.      Rows  RTB x10  GTB x10 GTB x10  BTB x 10 BTB 2x10   Low Rows    GTB 2x10 GTB 2x10   Supine thoracic ext     On towel roll. 30\"  Seated 10\"x 5 (pain)          Bike/Tread    Scifit L1 ~60spm. 8min       Ther Activity                                       Gait Training                                 Modalities                           "

## 2024-02-21 ENCOUNTER — TELEPHONE (OUTPATIENT)
Age: 49
End: 2024-02-21

## 2024-02-21 NOTE — TELEPHONE ENCOUNTER
Pt called in saying received an msg from Dr. Eli to schedule an appt to be seen at the practice did check the same on the chart and scheduled the patient for 03/12 please do advise is ok to schedule this date or if need to be seen earlier please do help with r/s and inform the same to the patient. Thanks

## 2024-02-22 ENCOUNTER — OFFICE VISIT (OUTPATIENT)
Facility: REHABILITATION | Age: 49
End: 2024-02-22
Payer: COMMERCIAL

## 2024-02-22 ENCOUNTER — APPOINTMENT (OUTPATIENT)
Facility: REHABILITATION | Age: 49
End: 2024-02-22
Payer: COMMERCIAL

## 2024-02-22 DIAGNOSIS — S06.0X0D CONCUSSION WITHOUT LOSS OF CONSCIOUSNESS, SUBSEQUENT ENCOUNTER: Primary | ICD-10-CM

## 2024-02-22 DIAGNOSIS — M54.2 NECK PAIN: ICD-10-CM

## 2024-02-22 DIAGNOSIS — G44.319 ACUTE POST-TRAUMATIC HEADACHE, NOT INTRACTABLE: ICD-10-CM

## 2024-02-22 PROCEDURE — 97110 THERAPEUTIC EXERCISES: CPT | Performed by: PHYSICAL THERAPIST

## 2024-02-22 NOTE — PROGRESS NOTES
"Daily Note     Today's date: 2024  Patient name: Obdulia Dawson  : 1975  MRN: [id number]  Referring provider: Declan lEi DO  Dx:   Encounter Diagnosis     ICD-10-CM    1. Concussion without loss of consciousness, subsequent encounter  S06.0X0D       2. Neck pain  M54.2       3. Acute post-traumatic headache, not intractable  G44.319                              Subjective: About 10 minutes after last visit she had an episodes of feeling warm, \"tunneling\", and nausea while scheduling some appointments. She moved up her appointment with Dr. Eli to discuss her symptoms. She has concerns about her progress because she doesn't feel PT has helped yet. Tried to stand on a bleacher to take a picture at her kid's game, but felt unbalanced trying to do so.           Objective: See treatment diary below        Assessment:  In concussion recovery, exacerbation of nausea/vomiting can occur with 30 minutes or so after completing an exercise session unfortunately. There has been some unpredictability with episodes of nausea and \"tunneling\" based on her reports and what we've seen in PT as well. We agreed that it would be helpful for her to follow up with her PCP regarding her episodes and to discuss her post concussion syndrome recovery to rule out any other medical concerns. Worked on increasing aerobic intensity today with monitoring of symptoms and heart rate today. She reported being able to tolerate with mild symptoms increase. Will use HR and intensity permanetes to continue progressing as tolerated. Fair tolerance to thoracic rotation in side lying reporting discomfort at location of her post rib pain. Held on VOR / balance training per pt request.   Encouraged Obdulia to continue performing exercises and reintegrating into normal routines with using symptoms as her guide allowing for rest and not over exerting. Will continue offer all of the knowledge and interventions at my disposal to help. " "    Plan: Continue per plan of care.  Address cervical AROM and muscle tenderness. Address oculomotor and VOR function. Address activity tolerance.        Precautions: DM2    Precautions:    Primary impairments:    Exercises given for HEP:   Access Code: [email or web address]  URL: [email or web address]  Date: 02/15/2024  Prepared by: Nader Rogel    Program Notes  Perform light aerobic exercise in form of stationary bike or walking 10min a day.     Exercises  - Seated Cervical Retraction  - 7 x weekly - 10 reps - 10 hold  - Seated Cervical Rotation AROM  - 7 x weekly - 2 sets - 10 reps - 10 hold  - Seated Horizontal Saccades  - 2 x daily - 7 x weekly - 3 reps - 30 hold  - Seated Vertical Saccades  - 2 x daily - 7 x weekly - 3 reps - 30 hold  - Seated Assisted Cervical Rotation with Towel  - 1 x daily - 7 x weekly - 10 reps - 5 sec hold  - Standing Shoulder Row with Anchored Resistance  - 1 x daily - 7 x weekly - 3 sets - 10 reps  - Shoulder extension with resistance - Neutral  - 1 x daily - 7 x weekly - 3 sets - 10 reps           Manual 2/22  2/15 2/20   STM    STM sub occipital (more on R)    STM to R scapula  Thoracic P-A mob Gr IV    STM to thoracic paraspinals    Gentle distraction       Neuro Re-Ed       VORx1       Saccades     15\" x2 ea    HT/HN walking    3 laps ea //bars                                                    Education    -Adding step coutner    -Emphasise upper cervical mobility exercises    -Adding band exercises for home    -Posture education     Ther Ex       Cervical snags   X5 5\" ea way  X10 5\" ea way    Chin tucks   X10 5\" stand    Upper trap stret       Rows BTB 2x10  GTB x10  BTB x 10 BTB 2x10   Low Rows  GTB 2x10  GTB 2x10 GTB 2x10   Open books About x10      Supine thoracic ext     On towel roll. 30\"  Seated 10\"x 5 (pain)   Shoulder ER YTB 2x8 R      Bike/Tread L3-5 scifit. 80-100spm. HR: 132        Ther Activity                                     Gait Training                   "             Modalities

## 2024-02-23 ENCOUNTER — APPOINTMENT (OUTPATIENT)
Facility: REHABILITATION | Age: 49
End: 2024-02-23
Payer: COMMERCIAL

## 2024-02-23 NOTE — TELEPHONE ENCOUNTER
Spoke w/pt. Pt declined to scheduled sooner w/[facility]. Pt stated that she was on a waiting list and for continuity of care wishes to stay with Dr. Eli.

## 2024-02-27 ENCOUNTER — APPOINTMENT (OUTPATIENT)
Facility: REHABILITATION | Age: 49
End: 2024-02-27
Payer: COMMERCIAL

## 2024-02-29 ENCOUNTER — APPOINTMENT (OUTPATIENT)
Facility: REHABILITATION | Age: 49
End: 2024-02-29
Payer: COMMERCIAL

## 2024-03-01 ENCOUNTER — APPOINTMENT (OUTPATIENT)
Facility: REHABILITATION | Age: 49
End: 2024-03-01
Payer: COMMERCIAL

## 2024-03-05 ENCOUNTER — OFFICE VISIT (OUTPATIENT)
Facility: REHABILITATION | Age: 49
End: 2024-03-05
Payer: COMMERCIAL

## 2024-03-05 DIAGNOSIS — S06.0X0D CONCUSSION WITHOUT LOSS OF CONSCIOUSNESS, SUBSEQUENT ENCOUNTER: Primary | ICD-10-CM

## 2024-03-05 DIAGNOSIS — G44.319 ACUTE POST-TRAUMATIC HEADACHE, NOT INTRACTABLE: ICD-10-CM

## 2024-03-05 DIAGNOSIS — R26.9 GAIT DISTURBANCE: ICD-10-CM

## 2024-03-05 DIAGNOSIS — M54.2 NECK PAIN: ICD-10-CM

## 2024-03-05 PROCEDURE — 97112 NEUROMUSCULAR REEDUCATION: CPT | Performed by: PHYSICAL THERAPIST

## 2024-03-05 NOTE — PROGRESS NOTES
"Daily Note     Today's date: 3/5/2024  Patient name: Obdulia Dawson  : 1975  MRN: [id number]  Referring provider: Declan Eli DO  Dx:   Encounter Diagnosis     ICD-10-CM    1. Concussion without loss of consciousness, subsequent encounter  S06.0X0D       2. Neck pain  M54.2       3. Acute post-traumatic headache, not intractable  G44.319       4. Gait disturbance  R26.9                          Subjective: Can do walks and stationary bike with out any significant symptom increase, but has headaches and pain in her spine all the time. Can do her neck/back exercises, which cause some pain, but reports is tolerable. She has pain at her thoracic spine with every breath still. She does some eye exercises at home with dizziness, but has not made her nauseous / vomiting. Using step counter but not consistently because she doesn't always have her phone. States she doesn't know why she still has not recovered. Still getting \"tunneling\" feeling, notes this when in the shower. Does not think that doing PT is helping her symptoms.           Objective: See treatment diary below    Resting HR: 103    Assessment: Time today spent addressing patient's concerns about her PT and her recovery. Went over the purpose of her interventions and that based on her symptoms we can offer manual interventions to manage pain, sub threshold exertional training, coordination training for dizziness, and strengthening for posture spine stability. She expresses that she doesn't feel her interventions are helpful. Recommendation was made that Obdulia d/c PT and follow up with PCP if yielding no benefits, but she requests to continue. Reports being consistent with HEP encompassing most exercises, will further discuss decreasing to 1x per week at next visit. Recommended referral to pain management, but pt not interested at this time. Recommended home TENS unit for additional pain management, pt. will discuss with PCP for DME script. " "Updated HEP with additional balance/coordination exercises. Treadmill d/c early due to poor pt response.     Plan: Continue per plan of care.  Address cervical AROM and muscle tenderness. Address oculomotor and VOR function. Address activity tolerance. Follow up with PCP.        Precautions: DM2    Precautions:    Primary impairments:    Access Code: [email or web address]  URL: [email or web address]  Date: 03/05/2024  Prepared by: Nader Rogel    Program Notes  Perform light aerobic exercise in form of stationary bike or walking 10min a day.     Exercises  - Seated Cervical Retraction  - 7 x weekly - 10 reps - 10 hold  - Seated Cervical Rotation AROM  - 7 x weekly - 2 sets - 10 reps - 10 hold  - Seated Horizontal Saccades  - 2 x daily - 7 x weekly - 3 reps - 30 hold  - Seated Vertical Saccades  - 2 x daily - 7 x weekly - 3 reps - 30 hold  - Seated Assisted Cervical Rotation with Towel  - 1 x daily - 7 x weekly - 10 reps - 5 sec hold  - Standing Shoulder Row with Anchored Resistance  - 1 x daily - 7 x weekly - 3 sets - 10 reps  - Shoulder extension with resistance - Neutral  - 1 x daily - 7 x weekly - 3 sets - 10 reps  - Walking with Head Rotation  - 1 x daily - 7 x weekly - 2 sets - 10 reps  - Walking with Head Nod  - 1 x daily - 7 x weekly - 2 sets - 10 reps  - Sidelying Open Book Thoracic Rotation with Knee on Foam Roll  - 1 x daily - 7 x weekly - 2 sets - 10 reps - 5 sec hold           Manual 2/22 3/5 2/15 2/20   STM    STM sub occipital (more on R)    STM to R scapula  Thoracic P-A mob Gr IV    STM to thoracic paraspinals    Gentle distraction       Neuro Re-Ed       VORx1       Saccades     15\" x2 ea    HT/HN walking    3 laps ea //bars                                                    Education   Interventions and options for treatment -Adding step coutner    -Emphasise upper cervical mobility exercises    -Adding band exercises for home    -Posture education     Ther Ex       Cervical snags   X5 5\" ea way  " "X10 5\" ea way    Chin tucks   X10 5\" stand    Upper trap stret       Rows BTB 2x10  GTB x10  BTB x 10 BTB 2x10   Low Rows  GTB 2x10  GTB 2x10 GTB 2x10   Open books About x10      Supine thoracic ext     On towel roll. 30\"  Seated 10\"x 5 (pain)   Shoulder ER YTB 2x8 R      Bike/Tread L3-5 scifit. 80-100spm. HR: 132 -treadmill with HR monitor. 4min. HR: 127. Reports increased pain.        Ther Activity                                     Gait Training                               Modalities                         "

## 2024-03-08 ENCOUNTER — APPOINTMENT (OUTPATIENT)
Facility: REHABILITATION | Age: 49
End: 2024-03-08
Payer: COMMERCIAL

## 2024-03-11 ENCOUNTER — EVALUATION (OUTPATIENT)
Facility: REHABILITATION | Age: 49
End: 2024-03-11
Payer: COMMERCIAL

## 2024-03-11 DIAGNOSIS — S06.0X0D CONCUSSION WITHOUT LOSS OF CONSCIOUSNESS, SUBSEQUENT ENCOUNTER: Primary | ICD-10-CM

## 2024-03-11 DIAGNOSIS — G44.319 ACUTE POST-TRAUMATIC HEADACHE, NOT INTRACTABLE: ICD-10-CM

## 2024-03-11 DIAGNOSIS — R26.9 GAIT DISTURBANCE: ICD-10-CM

## 2024-03-11 DIAGNOSIS — M54.2 NECK PAIN: ICD-10-CM

## 2024-03-11 PROCEDURE — 97110 THERAPEUTIC EXERCISES: CPT | Performed by: PHYSICAL THERAPIST

## 2024-03-11 PROCEDURE — 97112 NEUROMUSCULAR REEDUCATION: CPT | Performed by: PHYSICAL THERAPIST

## 2024-03-11 NOTE — PROGRESS NOTES
Re-Eval     Today's date: 3/11/2024  Patient name: Obdulia Dawson  : 1975  MRN: [id number]  Referring provider: Declan Eli DO  Dx:   Encounter Diagnosis     ICD-10-CM    1. Concussion without loss of consciousness, subsequent encounter  S06.0X0D       2. Neck pain  M54.2       3. Acute post-traumatic headache, not intractable  G44.319       4. Gait disturbance  R26.9                      Subjective: Confirms no changes from last visit. She still has been struggling with constant symptoms which have not improved since starting PT. She does do her PT at home but does not find them helpful. Has been able to go to her kid's sporting events but not with good tolerance. Can do some driving and cooking at home, but again not with good tolerance. Gets groceries delivered instead of going out to stores still.       Objective: See treatment diary below    Patient-Specific Functional Scale   Task is scored 0 (unable to perform activity) to 10 (ability to perform activity independently)  Activity 1/23  3/11   I want to be able to go to my kids activities  0  4   2.  Performing house shore: cooking 0  4   3.  Grocery shopping 0  0   4.   Driving long distances  short diatances 2.5  4          Cervical spine active range of motion:                          Right Left   Rotation 30    3/11: 30 30    3/11: 34   Sidebending 20 10   Flexion / extension Flex 20    3/11: 22     Ext 15    3/11: 15           OBJECTIVE:  Pain Assessment    1.23.24 3/11   Headache Frequency: daily  Duration: 1 hour  Intensity:         Best:4        Worst:> 10        Average: 7  Location: temporal, frontal, feels like my head is being squeezed  Exacerbating Factors: light/sound music/talking  Relieving Factors: pan meds Frequency: constant daily   Intensity: 7-10       Cervical spine /10 7-10 constant daily         Outcome Measures 1.24 3/11   Assistive device used? None None    Walking speed .75 meters/second 0.73 m/s    Functional  Gait Assessment (see below) 22/30 20/30   Patient-Reported Outcome Measure: Patient Specific Functional Scale (PSFS    2.5/40 12/40   Concussion Symptom Severity Score     22/22 categories  118/132 total score 21/22 categories answered  70-76 total score          Assessment: Obdulia has presented to neuro PT for dx of concussion, neck pain, post traumatic headache, and gait instability for over 4 weeks now. She has been able to participate in most exercises during visits and reports consistency with a HEP during that time. Over the past few visits unfortunately, she has reported both a lack of improvement in symptoms and a lack of benefit from PT interventions including manual interventions, therapeutic exercises, and VOR / coordination re-training. Testing today including cervical AROM test, dynamic balance testing, gait speed, and numeric pain rating scale also show a lack of improvement. We discussed her subjective Concussion Symptom Severity Score as she did rate herself lower, but she reported it was difficult to score herself accurately. She rated herself subjectively higher today with function which is reassuring, but her reported lack of symptom improvement and lack of improvement on objective testing warrants referral to her PCP and neurology.     We discussed that as her PT my job is to make her better, but she has clearly stated to me that her therapy so far has not been helpful. For Obdulia to improve, she will need to take different action. In this case in the form of seeing her PCP and neurology. I have exhausted all of the interventions I believe will be helpful at this time. My hope is that she will receive more help from seeing these two and with her symptoms managed more effectively can yield more benefit from PT in the future. Obdulia did not agree with holding on PT at this time, but was agreeable to scheduling a 4 week follow up for the time being.       Plan: 30 day hold on PT. Referral to  "PCP (already scheduled). Recommend referral to concussion specialist in neurology. Will continue to work with her PCP on plan. Continue HEP with graded return to normal activity, and follow up in 30 days unless advised otherwise by physician.        Precautions: DM2    Precautions:    Primary impairments:    Access Code: [email or web address]  URL: [email or web address]  Date: 03/05/2024  Prepared by: Nader Rogel    Program Notes  Perform light aerobic exercise in form of stationary bike or walking 10min a day.     Exercises  - Seated Cervical Retraction  - 7 x weekly - 10 reps - 10 hold  - Seated Cervical Rotation AROM  - 7 x weekly - 2 sets - 10 reps - 10 hold  - Seated Horizontal Saccades  - 2 x daily - 7 x weekly - 3 reps - 30 hold  - Seated Vertical Saccades  - 2 x daily - 7 x weekly - 3 reps - 30 hold  - Seated Assisted Cervical Rotation with Towel  - 1 x daily - 7 x weekly - 10 reps - 5 sec hold  - Standing Shoulder Row with Anchored Resistance  - 1 x daily - 7 x weekly - 3 sets - 10 reps  - Shoulder extension with resistance - Neutral  - 1 x daily - 7 x weekly - 3 sets - 10 reps  - Walking with Head Rotation  - 1 x daily - 7 x weekly - 2 sets - 10 reps  - Walking with Head Nod  - 1 x daily - 7 x weekly - 2 sets - 10 reps  - Sidelying Open Book Thoracic Rotation with Knee on Foam Roll  - 1 x daily - 7 x weekly - 2 sets - 10 reps - 5 sec hold           Manual 2/22 3/5 3/11 2/20   STM     Thoracic P-A mob Gr IV    STM to thoracic paraspinals    Gentle distraction       Neuro Re-Ed   See testing    VORx1       Saccades     15\" x2 ea    HT/HN walking    3 laps ea //bars                                                    Education   Interventions and options for treatment     Ther Ex   See testing    Cervical snags    X10 5\" ea way    Chin tucks       Upper trap stret       Rows BTB 2x10   BTB 2x10   Low Rows  GTB 2x10   GTB 2x10   Open books About x10      Supine thoracic ext     On towel roll. 30\"  Seated " "10\"x 5 (pain)   Shoulder ER YTB 2x8 R      Bike/Tread L3-5 scifit. 80-100spm. HR: 132 -treadmill with HR monitor. 4min. HR: 127. Reports increased pain.       Ther Activity                                    Gait Training                              Modalities                          "

## 2024-03-12 ENCOUNTER — OFFICE VISIT (OUTPATIENT)
Dept: FAMILY MEDICINE CLINIC | Facility: CLINIC | Age: 49
End: 2024-03-12
Payer: COMMERCIAL

## 2024-03-12 VITALS
WEIGHT: 168 LBS | HEART RATE: 72 BPM | SYSTOLIC BLOOD PRESSURE: 122 MMHG | OXYGEN SATURATION: 99 % | HEIGHT: 67 IN | TEMPERATURE: 97.6 F | DIASTOLIC BLOOD PRESSURE: 86 MMHG | BODY MASS INDEX: 26.37 KG/M2

## 2024-03-12 DIAGNOSIS — E11.9 TYPE 2 DIABETES MELLITUS WITHOUT COMPLICATION, WITHOUT LONG-TERM CURRENT USE OF INSULIN (HCC): ICD-10-CM

## 2024-03-12 DIAGNOSIS — E03.9 HYPOTHYROIDISM, UNSPECIFIED TYPE: ICD-10-CM

## 2024-03-12 DIAGNOSIS — M50.20 HERNIATED CERVICAL DISC: ICD-10-CM

## 2024-03-12 DIAGNOSIS — F41.1 GAD (GENERALIZED ANXIETY DISORDER): ICD-10-CM

## 2024-03-12 DIAGNOSIS — E11.69 HYPERLIPIDEMIA ASSOCIATED WITH TYPE 2 DIABETES MELLITUS: ICD-10-CM

## 2024-03-12 DIAGNOSIS — E78.5 HYPERLIPIDEMIA ASSOCIATED WITH TYPE 2 DIABETES MELLITUS: ICD-10-CM

## 2024-03-12 DIAGNOSIS — S06.0X0D CONCUSSION WITHOUT LOSS OF CONSCIOUSNESS, SUBSEQUENT ENCOUNTER: Primary | ICD-10-CM

## 2024-03-12 DIAGNOSIS — F41.9 ANXIETY: ICD-10-CM

## 2024-03-12 PROBLEM — S06.0XAA CONCUSSION: Status: ACTIVE | Noted: 2023-12-21

## 2024-03-12 PROCEDURE — 99214 OFFICE O/P EST MOD 30 MIN: CPT | Performed by: FAMILY MEDICINE

## 2024-03-12 RX ORDER — SEMAGLUTIDE 2.68 MG/ML
2 INJECTION, SOLUTION SUBCUTANEOUS
Qty: 8 ML | Refills: 5 | Status: SHIPPED | OUTPATIENT
Start: 2024-03-12

## 2024-03-12 RX ORDER — LEVOTHYROXINE SODIUM 137 UG/1
137 TABLET ORAL DAILY
Qty: 90 TABLET | Refills: 1 | Status: SHIPPED | OUTPATIENT
Start: 2024-03-12

## 2024-03-12 RX ORDER — ALPRAZOLAM 0.5 MG/1
0.5 TABLET ORAL 2 TIMES DAILY PRN
Qty: 180 TABLET | Refills: 1 | Status: SHIPPED | OUTPATIENT
Start: 2024-03-12

## 2024-03-12 RX ORDER — CITALOPRAM 20 MG/1
20 TABLET ORAL DAILY
Qty: 90 TABLET | Refills: 1 | Status: SHIPPED | OUTPATIENT
Start: 2024-03-12
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
## 2024-03-13 ENCOUNTER — APPOINTMENT (OUTPATIENT)
Facility: REHABILITATION | Age: 49
End: 2024-03-13
Payer: COMMERCIAL

## 2024-03-18 ENCOUNTER — APPOINTMENT (OUTPATIENT)
Facility: REHABILITATION | Age: 49
End: 2024-03-18
Payer: COMMERCIAL

## 2024-03-20 ENCOUNTER — APPOINTMENT (OUTPATIENT)
Facility: REHABILITATION | Age: 49
End: 2024-03-20
Payer: COMMERCIAL

## 2024-05-20 ENCOUNTER — TELEPHONE (OUTPATIENT)
Dept: FAMILY MEDICINE CLINIC | Facility: CLINIC | Age: 49
End: 2024-05-20

## 2024-05-21 ENCOUNTER — TELEPHONE (OUTPATIENT)
Age: 49
End: 2024-05-21

## 2024-05-21 NOTE — TELEPHONE ENCOUNTER
Tiana from [facility] they faxed over paperwork to be filled out about pre - diabetes.  Nothing scanned in at this time.    Keep a look out for paperwork.

## 2024-06-11 DIAGNOSIS — M50.20 HERNIATED CERVICAL DISC: Primary | ICD-10-CM

## 2024-07-29 DIAGNOSIS — E03.9 HYPOTHYROIDISM, UNSPECIFIED TYPE: ICD-10-CM

## 2024-07-29 DIAGNOSIS — E11.9 TYPE 2 DIABETES MELLITUS WITHOUT COMPLICATION, WITHOUT LONG-TERM CURRENT USE OF INSULIN (HCC): ICD-10-CM

## 2024-07-29 DIAGNOSIS — Z01.89 ENCOUNTER FOR TOBACCO USE SCREENING: Primary | ICD-10-CM

## 2024-07-29 DIAGNOSIS — F41.1 GAD (GENERALIZED ANXIETY DISORDER): ICD-10-CM

## 2024-07-29 DIAGNOSIS — F41.9 ANXIETY: ICD-10-CM

## 2024-07-29 RX ORDER — SEMAGLUTIDE 2.68 MG/ML
2 INJECTION, SOLUTION SUBCUTANEOUS
Qty: 8 ML | Refills: 5 | Status: SHIPPED | OUTPATIENT
Start: 2024-07-29

## 2024-07-29 RX ORDER — ALPRAZOLAM 0.5 MG/1
0.5 TABLET ORAL 2 TIMES DAILY PRN
Qty: 180 TABLET | Refills: 1 | Status: SHIPPED | OUTPATIENT
Start: 2024-07-29

## 2024-07-29 RX ORDER — CITALOPRAM 20 MG/1
20 TABLET ORAL DAILY
Qty: 90 TABLET | Refills: 1 | Status: SHIPPED | OUTPATIENT
Start: 2024-07-29

## 2024-07-29 RX ORDER — LEVOTHYROXINE SODIUM 137 UG/1
137 TABLET ORAL DAILY
Qty: 90 TABLET | Refills: 1 | Status: SHIPPED | OUTPATIENT
Start: 2024-07-29

## 2024-07-30 ENCOUNTER — TELEPHONE (OUTPATIENT)
Age: 49
End: 2024-07-30

## 2024-07-30 NOTE — TELEPHONE ENCOUNTER
Patient called in and stated that she needs her labs faxed over to [facility] - called over to the office and was advised they would fax labs over to .

## 2024-08-01 LAB
COMMENT: NORMAL
COTININE, URINE: NEGATIVE NG/ML
Lab: NEGATIVE NG/ML

## 2024-08-05 ENCOUNTER — PATIENT MESSAGE (OUTPATIENT)
Dept: FAMILY MEDICINE CLINIC | Facility: CLINIC | Age: 49
End: 2024-08-05

## 2024-08-05 NOTE — PATIENT COMMUNICATION
Pt is requesting if someone can contact [facility] about her Nicotine results as they do not appear in chart yet. Please call back when results are received

## 2024-08-06 ENCOUNTER — PATIENT MESSAGE (OUTPATIENT)
Dept: FAMILY MEDICINE CLINIC | Facility: CLINIC | Age: 49
End: 2024-08-06

## 2024-08-06 NOTE — TELEPHONE ENCOUNTER
Patient called, request status of Lab results, questions why results aren't within [facility] system, states needs results for life insurance. Upon chart review/messages, confirmed PM regarding results, contacted practice/clinical for clarification, Patient warm Transferred to ([facility]).

## 2024-08-06 NOTE — PATIENT COMMUNICATION
Pt called to check on this, did anyone call \[facility]\"" Labs to find out what happened with the results? Pt would like a call back today.

## 2024-08-07 DIAGNOSIS — M50.20 HERNIATED CERVICAL DISC: Primary | ICD-10-CM

## 2024-08-08 RX ORDER — GABAPENTIN 300 MG/1
300 CAPSULE ORAL
Qty: 30 CAPSULE | Refills: 3 | Status: SHIPPED | OUTPATIENT
Start: 2024-08-08

## 2024-08-26 ENCOUNTER — TELEPHONE (OUTPATIENT)
Dept: FAMILY MEDICINE CLINIC | Facility: CLINIC | Age: 49
End: 2024-08-26

## 2024-08-28 DIAGNOSIS — M50.20 HERNIATED CERVICAL DISC: Primary | ICD-10-CM

## 2024-08-28 DIAGNOSIS — M54.2 CERVICALGIA: ICD-10-CM

## 2024-10-18 ENCOUNTER — TELEPHONE (OUTPATIENT)
Dept: FAMILY MEDICINE CLINIC | Facility: CLINIC | Age: 49
End: 2024-10-18

## 2024-10-18 ENCOUNTER — TELEPHONE (OUTPATIENT)
Age: 49
End: 2024-10-18

## 2024-10-18 NOTE — TELEPHONE ENCOUNTER
Pt called concerned about her eye issue and has a 10/18/24 appointment with Dr. Ochoa. She stated that she is having an eye emergency and needed to have a provider from the office call a [facility]'s Ophthalmologist for an emergency appointment today 10/18/24 to get an appointment. As per Dr. Ochoa pt would need to seen in order for any actions to take place or if pt needs sooner care to go to ED. Pt was informed of this and understood. Pt stated that she had appointment set up with Ophthalmology in [facility] and no referral was needed. Pt asked for us to cancel her appointment with Dr. Ochoa.

## 2024-10-18 NOTE — TELEPHONE ENCOUNTER
Patient called because she stated she has been calling different eye specialists and there are no openings. She was told that she can have a doctor call from our office call ophthalmologist office to get her in for an emergency appointment with a provider. Warm transfer to [facility].

## 2024-10-28 DIAGNOSIS — M50.20 HERNIATED CERVICAL DISC: ICD-10-CM

## 2024-10-29 RX ORDER — GABAPENTIN 300 MG/1
300 CAPSULE ORAL
Qty: 30 CAPSULE | Refills: 3 | Status: SHIPPED | OUTPATIENT
Start: 2024-10-29

## 2024-11-11 DIAGNOSIS — R11.0 NAUSEA: Primary | ICD-10-CM

## 2024-11-11 DIAGNOSIS — M50.20 HERNIATED CERVICAL DISC: ICD-10-CM

## 2024-11-11 RX ORDER — GABAPENTIN 300 MG/1
300 CAPSULE ORAL 2 TIMES DAILY
Qty: 60 CAPSULE | Refills: 3 | Status: SHIPPED | OUTPATIENT
Start: 2024-11-11

## 2024-11-11 RX ORDER — ONDANSETRON 4 MG/1
4 TABLET, FILM COATED ORAL EVERY 8 HOURS PRN
Qty: 30 TABLET | Refills: 2 | Status: SHIPPED | OUTPATIENT
Start: 2024-11-11 | End: 2024-11-12

## 2024-11-12 ENCOUNTER — TELEPHONE (OUTPATIENT)
Age: 49
End: 2024-11-12

## 2024-11-12 DIAGNOSIS — R11.0 NAUSEA: Primary | ICD-10-CM

## 2024-11-12 RX ORDER — ONDANSETRON 8 MG/1
8 TABLET, ORALLY DISINTEGRATING ORAL EVERY 8 HOURS PRN
Qty: 30 TABLET | Refills: 2 | Status: SHIPPED | OUTPATIENT
Start: 2024-11-12

## 2024-11-12 NOTE — TELEPHONE ENCOUNTER
Pt called because she usually receives the dissolvable Zofran instead of the hard pill, she also mentioned that she received 4mg instead of 8 and would like this fixed as soon as possible. Please advise, thanks.

## 2024-11-18 DIAGNOSIS — F41.9 ANXIETY: ICD-10-CM

## 2024-11-18 DIAGNOSIS — E03.9 HYPOTHYROIDISM, UNSPECIFIED TYPE: ICD-10-CM

## 2024-11-20 RX ORDER — CITALOPRAM HYDROBROMIDE 20 MG/1
20 TABLET ORAL DAILY
Qty: 90 TABLET | Refills: 0 | Status: SHIPPED | OUTPATIENT
Start: 2024-11-20

## 2024-11-20 RX ORDER — LEVOTHYROXINE SODIUM 137 UG/1
137 TABLET ORAL DAILY
Qty: 90 TABLET | Refills: 0 | Status: SHIPPED | OUTPATIENT
Start: 2024-11-20

## 2025-01-15 DIAGNOSIS — E03.9 HYPOTHYROIDISM, UNSPECIFIED TYPE: ICD-10-CM

## 2025-01-15 DIAGNOSIS — F41.9 ANXIETY: ICD-10-CM

## 2025-01-15 DIAGNOSIS — F41.1 GAD (GENERALIZED ANXIETY DISORDER): ICD-10-CM

## 2025-01-15 DIAGNOSIS — R11.0 NAUSEA: ICD-10-CM

## 2025-01-15 DIAGNOSIS — M50.20 HERNIATED CERVICAL DISC: ICD-10-CM

## 2025-01-15 RX ORDER — ONDANSETRON 8 MG/1
8 TABLET, ORALLY DISINTEGRATING ORAL EVERY 8 HOURS PRN
Qty: 30 TABLET | Refills: 0 | Status: SHIPPED | OUTPATIENT
Start: 2025-01-15

## 2025-01-15 RX ORDER — LEVOTHYROXINE SODIUM 137 UG/1
137 TABLET ORAL DAILY
Qty: 90 TABLET | Refills: 1 | Status: SHIPPED | OUTPATIENT
Start: 2025-01-15

## 2025-01-15 RX ORDER — CITALOPRAM HYDROBROMIDE 20 MG/1
20 TABLET ORAL DAILY
Qty: 30 TABLET | Refills: 0 | Status: SHIPPED | OUTPATIENT
Start: 2025-01-15

## 2025-01-15 RX ORDER — ALPRAZOLAM 0.5 MG
0.5 TABLET ORAL 2 TIMES DAILY PRN
Qty: 60 TABLET | Refills: 2 | Status: SHIPPED | OUTPATIENT
Start: 2025-01-15

## 2025-01-15 RX ORDER — CITALOPRAM HYDROBROMIDE 20 MG/1
20 TABLET ORAL DAILY
Qty: 90 TABLET | Refills: 0 | OUTPATIENT
Start: 2025-01-15

## 2025-01-16 RX ORDER — ONDANSETRON 8 MG/1
8 TABLET, ORALLY DISINTEGRATING ORAL EVERY 8 HOURS PRN
Qty: 30 TABLET | Refills: 0 | OUTPATIENT
Start: 2025-01-16

## 2025-01-16 RX ORDER — CITALOPRAM HYDROBROMIDE 20 MG/1
20 TABLET ORAL DAILY
Qty: 30 TABLET | Refills: 0 | OUTPATIENT
Start: 2025-01-16

## 2025-01-16 RX ORDER — GABAPENTIN 300 MG/1
300 CAPSULE ORAL 2 TIMES DAILY
Qty: 60 CAPSULE | Refills: 0 | Status: SHIPPED | OUTPATIENT
Start: 2025-01-16

## 2025-01-16 RX ORDER — ALPRAZOLAM 0.5 MG
0.5 TABLET ORAL 2 TIMES DAILY PRN
Qty: 180 TABLET | Refills: 0 | OUTPATIENT
Start: 2025-01-16

## 2025-01-16 RX ORDER — LEVOTHYROXINE SODIUM 137 UG/1
137 TABLET ORAL DAILY
Qty: 90 TABLET | Refills: 0 | OUTPATIENT
Start: 2025-01-16

## 2025-02-17 DIAGNOSIS — F41.9 ANXIETY: ICD-10-CM

## 2025-02-17 DIAGNOSIS — M50.20 HERNIATED CERVICAL DISC: ICD-10-CM

## 2025-02-18 RX ORDER — GABAPENTIN 300 MG/1
300 CAPSULE ORAL 2 TIMES DAILY
Qty: 60 CAPSULE | Refills: 0 | Status: SHIPPED | OUTPATIENT
Start: 2025-02-18

## 2025-02-18 RX ORDER — CITALOPRAM HYDROBROMIDE 20 MG/1
20 TABLET ORAL DAILY
Qty: 30 TABLET | Refills: 0 | Status: SHIPPED | OUTPATIENT
Start: 2025-02-18

## 2025-02-28 DIAGNOSIS — R11.0 NAUSEA: ICD-10-CM

## 2025-02-28 RX ORDER — ONDANSETRON 8 MG/1
8 TABLET, ORALLY DISINTEGRATING ORAL EVERY 8 HOURS PRN
Qty: 30 TABLET | Refills: 0 | Status: SHIPPED | OUTPATIENT
Start: 2025-02-28

## 2025-03-01 DIAGNOSIS — R11.0 NAUSEA: ICD-10-CM

## 2025-03-03 RX ORDER — ONDANSETRON 8 MG/1
8 TABLET, ORALLY DISINTEGRATING ORAL EVERY 8 HOURS PRN
Qty: 30 TABLET | Refills: 0 | OUTPATIENT
Start: 2025-03-03

## 2025-03-13 DIAGNOSIS — M50.20 HERNIATED CERVICAL DISC: ICD-10-CM

## 2025-03-13 DIAGNOSIS — F41.9 ANXIETY: ICD-10-CM

## 2025-03-14 RX ORDER — GABAPENTIN 300 MG/1
300 CAPSULE ORAL 2 TIMES DAILY
Qty: 60 CAPSULE | Refills: 0 | Status: SHIPPED | OUTPATIENT
Start: 2025-03-14

## 2025-03-14 RX ORDER — CITALOPRAM HYDROBROMIDE 20 MG/1
20 TABLET ORAL DAILY
Qty: 30 TABLET | Refills: 0 | Status: SHIPPED | OUTPATIENT
Start: 2025-03-14

## 2025-04-09 DIAGNOSIS — F41.1 GAD (GENERALIZED ANXIETY DISORDER): ICD-10-CM

## 2025-04-09 DIAGNOSIS — M50.20 HERNIATED CERVICAL DISC: ICD-10-CM

## 2025-04-09 DIAGNOSIS — R11.0 NAUSEA: ICD-10-CM

## 2025-04-09 DIAGNOSIS — F41.9 ANXIETY: ICD-10-CM

## 2025-04-10 RX ORDER — ALPRAZOLAM 0.5 MG
0.5 TABLET ORAL 2 TIMES DAILY PRN
Qty: 60 TABLET | Refills: 1 | Status: SHIPPED | OUTPATIENT
Start: 2025-04-10

## 2025-04-10 RX ORDER — CITALOPRAM HYDROBROMIDE 20 MG/1
20 TABLET ORAL DAILY
Qty: 30 TABLET | Refills: 0 | Status: SHIPPED | OUTPATIENT
Start: 2025-04-10

## 2025-04-10 RX ORDER — ONDANSETRON 8 MG/1
8 TABLET, ORALLY DISINTEGRATING ORAL EVERY 8 HOURS PRN
Qty: 30 TABLET | Refills: 0 | Status: SHIPPED | OUTPATIENT
Start: 2025-04-10

## 2025-04-10 RX ORDER — GABAPENTIN 300 MG/1
300 CAPSULE ORAL 2 TIMES DAILY
Qty: 60 CAPSULE | Refills: 0 | Status: SHIPPED | OUTPATIENT
Start: 2025-04-10

## 2025-04-28 DIAGNOSIS — F41.9 ANXIETY: ICD-10-CM

## 2025-04-28 DIAGNOSIS — M50.20 HERNIATED CERVICAL DISC: ICD-10-CM

## 2025-04-28 RX ORDER — GABAPENTIN 300 MG/1
300 CAPSULE ORAL 2 TIMES DAILY
Qty: 60 CAPSULE | Refills: 5 | Status: SHIPPED | OUTPATIENT
Start: 2025-04-28

## 2025-04-28 RX ORDER — CITALOPRAM HYDROBROMIDE 20 MG/1
20 TABLET ORAL DAILY
Qty: 30 TABLET | Refills: 5 | Status: SHIPPED | OUTPATIENT
Start: 2025-04-28

## 2025-05-02 DIAGNOSIS — F41.1 GAD (GENERALIZED ANXIETY DISORDER): ICD-10-CM

## 2025-05-04 RX ORDER — ALPRAZOLAM 0.5 MG
0.5 TABLET ORAL 2 TIMES DAILY PRN
Qty: 60 TABLET | Refills: 2 | Status: SHIPPED | OUTPATIENT
Start: 2025-05-04

## 2025-05-06 ENCOUNTER — TELEPHONE (OUTPATIENT)
Age: 50
End: 2025-05-06

## 2025-05-06 NOTE — TELEPHONE ENCOUNTER
[facility] is waiting for payment. Records cannot be sent until payment is received. They can call [phone] Option 1 to talk to [facility].

## 2025-05-27 ENCOUNTER — OFFICE VISIT (OUTPATIENT)
Dept: FAMILY MEDICINE CLINIC | Facility: CLINIC | Age: 50
End: 2025-05-27
Payer: COMMERCIAL

## 2025-05-27 ENCOUNTER — APPOINTMENT (OUTPATIENT)
Dept: LAB | Facility: CLINIC | Age: 50
End: 2025-05-27
Payer: COMMERCIAL

## 2025-05-27 VITALS
WEIGHT: 165 LBS | HEART RATE: 99 BPM | OXYGEN SATURATION: 99 % | HEIGHT: 67 IN | BODY MASS INDEX: 25.9 KG/M2 | DIASTOLIC BLOOD PRESSURE: 60 MMHG | SYSTOLIC BLOOD PRESSURE: 100 MMHG

## 2025-05-27 DIAGNOSIS — F41.1 GAD (GENERALIZED ANXIETY DISORDER): Primary | ICD-10-CM

## 2025-05-27 DIAGNOSIS — D25.9 UTERINE LEIOMYOMA, UNSPECIFIED LOCATION: ICD-10-CM

## 2025-05-27 DIAGNOSIS — E03.9 HYPOTHYROIDISM, UNSPECIFIED TYPE: ICD-10-CM

## 2025-05-27 DIAGNOSIS — R73.03 PREDIABETES: ICD-10-CM

## 2025-05-27 DIAGNOSIS — N92.6 MENSTRUAL CHANGES: ICD-10-CM

## 2025-05-27 DIAGNOSIS — E78.5 DYSLIPIDEMIA: ICD-10-CM

## 2025-05-27 DIAGNOSIS — Z13.0 SCREENING FOR DEFICIENCY ANEMIA: ICD-10-CM

## 2025-05-27 DIAGNOSIS — G47.33 OBSTRUCTIVE SLEEP APNEA: ICD-10-CM

## 2025-05-27 PROBLEM — S06.0XAA CONCUSSION: Status: RESOLVED | Noted: 2023-12-21 | Resolved: 2025-05-27

## 2025-05-27 PROBLEM — E11.9 TYPE 2 DIABETES MELLITUS WITHOUT COMPLICATION, WITHOUT LONG-TERM CURRENT USE OF INSULIN (HCC): Status: RESOLVED | Noted: 2023-02-27 | Resolved: 2025-05-27

## 2025-05-27 PROBLEM — E11.69 HYPERLIPIDEMIA ASSOCIATED WITH TYPE 2 DIABETES MELLITUS  (HCC): Status: RESOLVED | Noted: 2024-03-12 | Resolved: 2025-05-27

## 2025-05-27 LAB
ALBUMIN SERPL BCG-MCNC: 4.4 G/DL (ref 3.5–5)
ALP SERPL-CCNC: 53 U/L (ref 34–104)
ALT SERPL W P-5'-P-CCNC: 8 U/L (ref 7–52)
ANION GAP SERPL CALCULATED.3IONS-SCNC: 10 MMOL/L (ref 4–13)
AST SERPL W P-5'-P-CCNC: 13 U/L (ref 13–39)
BASOPHILS # BLD AUTO: 0.05 THOUSANDS/ÂΜL (ref 0–0.1)
BASOPHILS NFR BLD AUTO: 1 % (ref 0–1)
BILIRUB SERPL-MCNC: 0.24 MG/DL (ref 0.2–1)
BUN SERPL-MCNC: 9 MG/DL (ref 5–25)
CALCIUM SERPL-MCNC: 8.8 MG/DL (ref 8.4–10.2)
CHLORIDE SERPL-SCNC: 106 MMOL/L (ref 96–108)
CHOLEST SERPL-MCNC: 193 MG/DL (ref ?–200)
CO2 SERPL-SCNC: 25 MMOL/L (ref 21–32)
CREAT SERPL-MCNC: 0.76 MG/DL (ref 0.6–1.3)
EOSINOPHIL # BLD AUTO: 0.12 THOUSAND/ÂΜL (ref 0–0.61)
EOSINOPHIL NFR BLD AUTO: 2 % (ref 0–6)
ERYTHROCYTE [DISTWIDTH] IN BLOOD BY AUTOMATED COUNT: 15.7 % (ref 11.6–15.1)
EST. AVERAGE GLUCOSE BLD GHB EST-MCNC: 117 MG/DL
ESTRADIOL SERPL-MCNC: 17.9 PG/ML
FSH SERPL-ACNC: 15.3 MIU/ML
GFR SERPL CREATININE-BSD FRML MDRD: 91 ML/MIN/1.73SQ M
GLUCOSE P FAST SERPL-MCNC: 94 MG/DL (ref 65–99)
HBA1C MFR BLD: 5.7 %
HCT VFR BLD AUTO: 31.2 % (ref 34.8–46.1)
HDLC SERPL-MCNC: 44 MG/DL
HGB BLD-MCNC: 9.6 G/DL (ref 11.5–15.4)
IMM GRANULOCYTES # BLD AUTO: 0.03 THOUSAND/UL (ref 0–0.2)
IMM GRANULOCYTES NFR BLD AUTO: 1 % (ref 0–2)
LDLC SERPL CALC-MCNC: 128 MG/DL (ref 0–100)
LH SERPL-ACNC: 13 MIU/ML
LYMPHOCYTES # BLD AUTO: 1.25 THOUSANDS/ÂΜL (ref 0.6–4.47)
LYMPHOCYTES NFR BLD AUTO: 24 % (ref 14–44)
MCH RBC QN AUTO: 25.3 PG (ref 26.8–34.3)
MCHC RBC AUTO-ENTMCNC: 30.8 G/DL (ref 31.4–37.4)
MCV RBC AUTO: 82 FL (ref 82–98)
MONOCYTES # BLD AUTO: 0.51 THOUSAND/ÂΜL (ref 0.17–1.22)
MONOCYTES NFR BLD AUTO: 10 % (ref 4–12)
NEUTROPHILS # BLD AUTO: 3.29 THOUSANDS/ÂΜL (ref 1.85–7.62)
NEUTS SEG NFR BLD AUTO: 62 % (ref 43–75)
NRBC BLD AUTO-RTO: 0 /100 WBCS
PLATELET # BLD AUTO: 306 THOUSANDS/UL (ref 149–390)
PMV BLD AUTO: 11.5 FL (ref 8.9–12.7)
POTASSIUM SERPL-SCNC: 4.2 MMOL/L (ref 3.5–5.3)
PROT SERPL-MCNC: 6.7 G/DL (ref 6.4–8.4)
RBC # BLD AUTO: 3.8 MILLION/UL (ref 3.81–5.12)
SODIUM SERPL-SCNC: 141 MMOL/L (ref 135–147)
T4 FREE SERPL-MCNC: 0.61 NG/DL (ref 0.61–1.12)
TRIGL SERPL-MCNC: 103 MG/DL (ref ?–150)
TSH SERPL DL<=0.05 MIU/L-ACNC: 9.4 UIU/ML (ref 0.45–4.5)
WBC # BLD AUTO: 5.25 THOUSAND/UL (ref 4.31–10.16)

## 2025-05-27 PROCEDURE — 82670 ASSAY OF TOTAL ESTRADIOL: CPT

## 2025-05-27 PROCEDURE — 83036 HEMOGLOBIN GLYCOSYLATED A1C: CPT

## 2025-05-27 PROCEDURE — 36415 COLL VENOUS BLD VENIPUNCTURE: CPT

## 2025-05-27 PROCEDURE — 99214 OFFICE O/P EST MOD 30 MIN: CPT | Performed by: FAMILY MEDICINE

## 2025-05-27 PROCEDURE — 84439 ASSAY OF FREE THYROXINE: CPT

## 2025-05-27 PROCEDURE — 85025 COMPLETE CBC W/AUTO DIFF WBC: CPT

## 2025-05-27 PROCEDURE — 83002 ASSAY OF GONADOTROPIN (LH): CPT

## 2025-05-27 PROCEDURE — 83001 ASSAY OF GONADOTROPIN (FSH): CPT

## 2025-05-27 PROCEDURE — 84443 ASSAY THYROID STIM HORMONE: CPT

## 2025-05-27 PROCEDURE — 80053 COMPREHEN METABOLIC PANEL: CPT

## 2025-05-27 PROCEDURE — 80061 LIPID PANEL: CPT

## 2025-05-27 NOTE — ASSESSMENT & PLAN NOTE
Uses Ozempic but only every 10 to 14 days.  Orders:  •  Comprehensive metabolic panel; Future  •  Hemoglobin A1C; Future
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
## 2025-05-27 NOTE — ASSESSMENT & PLAN NOTE
Continue levothyroxine.  Recheck lipid panel  Orders:  •  TSH, 3rd generation with Free T4 reflex; Future

## 2025-05-28 ENCOUNTER — RESULTS FOLLOW-UP (OUTPATIENT)
Dept: FAMILY MEDICINE CLINIC | Facility: CLINIC | Age: 50
End: 2025-05-28

## 2025-05-28 DIAGNOSIS — M50.20 HERNIATED CERVICAL DISC: ICD-10-CM

## 2025-05-28 DIAGNOSIS — E03.9 HYPOTHYROIDISM, UNSPECIFIED TYPE: ICD-10-CM

## 2025-05-28 DIAGNOSIS — D50.9 IRON DEFICIENCY ANEMIA, UNSPECIFIED IRON DEFICIENCY ANEMIA TYPE: Primary | ICD-10-CM

## 2025-05-28 DIAGNOSIS — D25.9 UTERINE LEIOMYOMA, UNSPECIFIED LOCATION: Primary | ICD-10-CM

## 2025-05-28 DIAGNOSIS — Z12.11 SCREEN FOR COLON CANCER: ICD-10-CM

## 2025-05-28 RX ORDER — GABAPENTIN 300 MG/1
300 CAPSULE ORAL 3 TIMES DAILY
Qty: 90 CAPSULE | Refills: 5 | Status: SHIPPED | OUTPATIENT
Start: 2025-05-28

## 2025-05-28 RX ORDER — LEVOTHYROXINE SODIUM 150 UG/1
137 TABLET ORAL DAILY
Qty: 90 TABLET | Refills: 1 | Status: SHIPPED | OUTPATIENT
Start: 2025-05-28

## 2025-06-16 DIAGNOSIS — R11.0 NAUSEA: ICD-10-CM

## 2025-06-17 RX ORDER — ONDANSETRON 8 MG/1
8 TABLET, ORALLY DISINTEGRATING ORAL EVERY 8 HOURS PRN
Qty: 30 TABLET | Refills: 0 | Status: SHIPPED | OUTPATIENT
Start: 2025-06-17

## 2025-07-07 DIAGNOSIS — E11.9 TYPE 2 DIABETES MELLITUS WITHOUT COMPLICATION, WITHOUT LONG-TERM CURRENT USE OF INSULIN (HCC): ICD-10-CM

## 2025-07-08 RX ORDER — SEMAGLUTIDE 2.68 MG/ML
2 INJECTION, SOLUTION SUBCUTANEOUS
Qty: 9 ML | Refills: 0 | Status: SHIPPED | OUTPATIENT
Start: 2025-07-08

## 2025-07-13 DIAGNOSIS — E11.9 TYPE 2 DIABETES MELLITUS WITHOUT COMPLICATION, WITHOUT LONG-TERM CURRENT USE OF INSULIN (HCC): ICD-10-CM

## 2025-07-15 RX ORDER — SEMAGLUTIDE 2.68 MG/ML
2 INJECTION, SOLUTION SUBCUTANEOUS
Qty: 9 ML | Refills: 0 | OUTPATIENT
Start: 2025-07-15

## 2025-07-23 DIAGNOSIS — R11.0 NAUSEA: ICD-10-CM

## 2025-07-24 RX ORDER — ONDANSETRON 8 MG/1
8 TABLET, ORALLY DISINTEGRATING ORAL EVERY 8 HOURS PRN
Qty: 30 TABLET | Refills: 0 | Status: SHIPPED | OUTPATIENT
Start: 2025-07-24

## 2025-08-22 ENCOUNTER — TELEPHONE (OUTPATIENT)
Age: 50
End: 2025-08-22